# Patient Record
Sex: FEMALE | Race: BLACK OR AFRICAN AMERICAN | NOT HISPANIC OR LATINO | Employment: OTHER | URBAN - METROPOLITAN AREA
[De-identification: names, ages, dates, MRNs, and addresses within clinical notes are randomized per-mention and may not be internally consistent; named-entity substitution may affect disease eponyms.]

---

## 2017-09-26 ENCOUNTER — HOSPITAL ENCOUNTER (OUTPATIENT)
Dept: RADIOLOGY | Facility: HOSPITAL | Age: 57
Discharge: HOME | End: 2017-09-26
Attending: SURGERY
Payer: MEDICARE

## 2017-09-26 DIAGNOSIS — M54.30 SCIATICA: ICD-10-CM

## 2017-09-26 PROCEDURE — 72148 MRI LUMBAR SPINE W/O DYE: CPT | Mod: 26,,, | Performed by: RADIOLOGY

## 2017-09-26 PROCEDURE — 72148 MRI LUMBAR SPINE W/O DYE: CPT | Mod: TC

## 2018-05-16 ENCOUNTER — HOSPITAL ENCOUNTER (OUTPATIENT)
Dept: RADIOLOGY | Facility: HOSPITAL | Age: 58
Discharge: HOME | End: 2018-05-16
Attending: SURGERY
Payer: MEDICARE

## 2018-05-16 DIAGNOSIS — R13.10 DYSPHAGIA: ICD-10-CM

## 2018-05-16 PROCEDURE — 74220 X-RAY XM ESOPHAGUS 1CNTRST: CPT | Mod: TC

## 2018-05-16 PROCEDURE — 74220 X-RAY XM ESOPHAGUS 1CNTRST: CPT | Mod: 26,,, | Performed by: RADIOLOGY

## 2018-05-25 ENCOUNTER — HOSPITAL ENCOUNTER (EMERGENCY)
Facility: HOSPITAL | Age: 58
Discharge: HOME | End: 2018-05-25
Attending: EMERGENCY MEDICINE
Payer: MEDICARE

## 2018-05-25 VITALS
DIASTOLIC BLOOD PRESSURE: 82 MMHG | HEART RATE: 62 BPM | BODY MASS INDEX: 45.49 KG/M2 | TEMPERATURE: 98 F | SYSTOLIC BLOOD PRESSURE: 136 MMHG | WEIGHT: 265 LBS | RESPIRATION RATE: 18 BRPM

## 2018-05-25 DIAGNOSIS — G43.909 MIGRAINE WITHOUT STATUS MIGRAINOSUS, NOT INTRACTABLE, UNSPECIFIED MIGRAINE TYPE: Primary | ICD-10-CM

## 2018-05-25 PROCEDURE — 63600175 PHARM REV CODE 636 W HCPCS: Performed by: EMERGENCY MEDICINE

## 2018-05-25 PROCEDURE — 25000003 PHARM REV CODE 250: Performed by: EMERGENCY MEDICINE

## 2018-05-25 RX ORDER — KETOROLAC TROMETHAMINE 30 MG/ML
30 INJECTION, SOLUTION INTRAMUSCULAR; INTRAVENOUS
Status: COMPLETED | OUTPATIENT
Start: 2018-05-25 | End: 2018-05-25

## 2018-05-25 RX ORDER — BUTALBITAL, ACETAMINOPHEN AND CAFFEINE 50; 325; 40 MG/1; MG/1; MG/1
2 TABLET ORAL
Status: COMPLETED | OUTPATIENT
Start: 2018-05-25 | End: 2018-05-25

## 2018-05-25 RX ORDER — METOCLOPRAMIDE HYDROCHLORIDE 5 MG/ML
10 INJECTION INTRAMUSCULAR; INTRAVENOUS
Status: COMPLETED | OUTPATIENT
Start: 2018-05-25 | End: 2018-05-25

## 2018-05-25 RX ORDER — SUMATRIPTAN 6 MG/.5ML
6 INJECTION, SOLUTION SUBCUTANEOUS
Status: COMPLETED | OUTPATIENT
Start: 2018-05-25 | End: 2018-05-25

## 2018-05-25 RX ADMIN — SODIUM CHLORIDE 1000 ML: 0.9 INJECTION, SOLUTION INTRAVENOUS at 03:05

## 2018-05-25 RX ADMIN — METOCLOPRAMIDE 10 MG: 5 INJECTION, SOLUTION INTRAMUSCULAR; INTRAVENOUS at 03:05

## 2018-05-25 RX ADMIN — SUMATRIPTAN 6 MG: 6 INJECTION, SOLUTION SUBCUTANEOUS at 03:05

## 2018-05-25 RX ADMIN — BUTALBITAL, ACETAMINOPHEN, AND CAFFEINE 2 TABLET: 50; 325; 40 TABLET ORAL at 03:05

## 2018-05-25 RX ADMIN — KETOROLAC TROMETHAMINE 30 MG: 30 INJECTION, SOLUTION INTRAMUSCULAR at 03:05

## 2018-05-25 NOTE — ED TRIAGE NOTES
57 y.o. female presents to ER ED 02/ED 02A   Chief Complaint   Patient presents with    Migraine   pt reports migraine, pain is described as sharp pain to right eye down to neck. ibuprofen taken PTA. No acute distress noted.

## 2018-05-25 NOTE — ED PROVIDER NOTES
[facility]                                                  Chief Complaint  57 y.o. female with Migraine    History of Present Illness  Christina Townsend presents to the emergency room with complaints of acute migraine.  Patient has a history of migraines and reports that this one feels exactly the same as her previous migraines.  She gets approximately 2-3 migraines per year.  She has not had any migraine medicine at home.  She reports photophobia and nausea.  She denies neurologic symptoms other than headache.  She appears very comfortable.      Past Medical History:   Diagnosis Date    Morbid obesity     RA (rheumatoid arthritis)      Past Surgical History:   Procedure Laterality Date    CARPAL TUNNEL RELEASE  2009    left    HYSTERECTOMY      JOINT REPLACEMENT      partial hysterctomy  2010    TOTAL KNEE ARTHROPLASTY  4/4/11    left    TUBAL LIGATION        Review of patient's allergies indicates:   Allergen Reactions    Percocet [oxycodone-acetaminophen] Itching        Review of Systems and Physical Exam     Review of Systems  -- Constitution - no fever, denies fatigue, no weakness, no chills  -- Eyes - no tearing or redness, no visual disturbance  -- Ear, Nose - no tinnitus or earache, no nasal congestion or discharge  -- Mouth,Throat - no sore throat, no toothache, normal voice, normal swallowing  -- Respiratory - denies cough and congestion, no shortness of breath, no wheezing  -- Cardiovascular - denies chest pain, no palpitations, denies claudication  -- Gastrointestinal - denies abdominal pain, nausea, vomiting, or diarrhea  -- Genitourinary - no dysuria, no denies flank pain, no hematuria or frequency   -- Musculoskeletal - denies back pain, negative for myalgias and arthralgias   -- Neurological -reports headache, denies weakness or seizure; no LOC  -- Skin - denies pallor, rash, or changes in skin. no hives or welts noted    Vital Signs   weight is 120.2 kg (265 lb). Her  oral temperature is 98.1 °F (36.7 °C). Her blood pressure is 150/69 (abnormal) and her pulse is 76. Her respiration is 20.      Physical Exam  -- Nursing note and vitals reviewed  -- Constitutional: Appears well-developed and well-nourished, vitals within normal limits  -- Head: Atraumatic. Normocephalic. No obvious abnormality  -- Eyes: Pupils are equal and reactive to light. Normal conjunctiva and lids  -- Nose: Nose normal in appearance, nares grossly normal. No discharge  -- Throat: Mucous membranes moist, pharynx normal, normal tonsils. No lesions   -- Ears: External ears and TM normal bilaterally. Normal hearing and no drainage  -- Neck: Normal range of motion. Neck supple. No masses, trachea midline  -- Cardiac: Normal rate, regular rhythm and normal heart sounds  -- Pulmonary: Normal respiratory effort, breath sounds clear to auscultation  -- Abdominal: Soft, no tenderness. Normal bowel sounds. Normal liver edge  -- Musculoskeletal: Normal range of motion, no effusions. Joints stable   -- Neurological: Cranial nerves II through XII grossly intact No focal deficits. Showed good interaction with staff  -- Vascular: Posterior tibial, dorsalis pedis and radial pulses 2+ bilaterally    -- Lymphatics: No cervical or peripheral lymphadenopathy. No edema noted  -- Skin: Warm and dry. No evidence of rash or cellulitis  -- Psychiatric: Normal mood and affect. Bedside behavior is appropriate    Emergency Room Course     Treatment and Evaluation  1.  Physical exam consistent with migraine  2.  No saline 1 L with 10 mg of Reglan  3.  Imitrex 6 mg subcutaneous  4.  Toradol 30 IV  5.  Fioricet 2 tablets by mouth  6.  Patient feels complete relief of symptoms at 1610, DC home follow-up PCP    Abnormal lab values  Labs Reviewed - No data to display    Medications Given  Medications   sodium chloride 0.9% bolus 1,000 mL (1,000 mLs Intravenous New Bag 5/25/18 1521)   butalbital-acetaminophen-caffeine -40 mg per tablet 2  tablet (2 tablets Oral Given 5/25/18 1514)   ketorolac injection 30 mg (30 mg Intravenous Given 5/25/18 1521)   metoclopramide HCl injection 10 mg (10 mg Intravenous Given 5/25/18 1521)   SUMAtriptan succinate injection 6 mg (6 mg Subcutaneous Given 5/25/18 1515)         Diagnosis  -- Migraine    Disposition and Plan  -- Disposition: home  -- Condition: stable  -- Follow-up: Patient to follow up with Dino Rosado MD in 1-2 days.  -- I advised the patient that we have found no life threatening condition today  -- At this time, I believe the patient is clinically stable for discharge.   -- The patient acknowledges that close follow up with a MD is required   -- Patient agrees to comply with all instruction and direction given in the ER  -- Patient counseled on strict return precautions as discussed       Chyna Delgado MD  05/25/18 5359

## 2019-03-08 ENCOUNTER — HOSPITAL ENCOUNTER (EMERGENCY)
Facility: HOSPITAL | Age: 59
Discharge: HOME | End: 2019-03-08
Attending: SURGERY
Payer: MEDICARE

## 2019-03-08 VITALS
BODY MASS INDEX: 43.1 KG/M2 | SYSTOLIC BLOOD PRESSURE: 121 MMHG | HEART RATE: 81 BPM | TEMPERATURE: 100 F | DIASTOLIC BLOOD PRESSURE: 67 MMHG | RESPIRATION RATE: 19 BRPM | OXYGEN SATURATION: 97 % | WEIGHT: 251.13 LBS

## 2019-03-08 DIAGNOSIS — R52 GENERALIZED BODY ACHES: ICD-10-CM

## 2019-03-08 DIAGNOSIS — J40 BRONCHITIS: Primary | ICD-10-CM

## 2019-03-08 LAB
ALBUMIN SERPL BCP-MCNC: 3.3 G/DL
ALP SERPL-CCNC: 60 U/L
ALT SERPL W/O P-5'-P-CCNC: 9 U/L
ANION GAP SERPL CALC-SCNC: 9 MMOL/L
AST SERPL-CCNC: 15 U/L
BASOPHILS # BLD AUTO: 0.02 K/UL
BASOPHILS NFR BLD: 0.2 %
BILIRUB SERPL-MCNC: 0.7 MG/DL
BILIRUB UR QL STRIP: NEGATIVE
BUN SERPL-MCNC: 11 MG/DL
CALCIUM SERPL-MCNC: 9.3 MG/DL
CHLORIDE SERPL-SCNC: 101 MMOL/L
CK MB SERPL-MCNC: 0.2 NG/ML
CK MB SERPL-RTO: 0.1 %
CK SERPL-CCNC: 142 U/L
CK SERPL-CCNC: 142 U/L
CLARITY UR: CLEAR
CO2 SERPL-SCNC: 27 MMOL/L
COLOR UR: YELLOW
CREAT SERPL-MCNC: 1 MG/DL
DEPRECATED S PYO AG THROAT QL EIA: NEGATIVE
DIFFERENTIAL METHOD: ABNORMAL
EOSINOPHIL # BLD AUTO: 0 K/UL
EOSINOPHIL NFR BLD: 0.1 %
ERYTHROCYTE [DISTWIDTH] IN BLOOD BY AUTOMATED COUNT: 16.1 %
EST. GFR  (AFRICAN AMERICAN): >60 ML/MIN/1.73 M^2
EST. GFR  (NON AFRICAN AMERICAN): >60 ML/MIN/1.73 M^2
GLUCOSE SERPL-MCNC: 104 MG/DL
GLUCOSE UR QL STRIP: NEGATIVE
HCT VFR BLD AUTO: 39.6 %
HETEROPH AB SERPL QL IA: NEGATIVE
HGB BLD-MCNC: 13 G/DL
HGB UR QL STRIP: ABNORMAL
INFLUENZA A, MOLECULAR: NEGATIVE
INFLUENZA B, MOLECULAR: NEGATIVE
KETONES UR QL STRIP: NEGATIVE
LACTATE SERPL-SCNC: 0.7 MMOL/L
LEUKOCYTE ESTERASE UR QL STRIP: NEGATIVE
LYMPHOCYTES # BLD AUTO: 1.4 K/UL
LYMPHOCYTES NFR BLD: 15.9 %
MCH RBC QN AUTO: 26.3 PG
MCHC RBC AUTO-ENTMCNC: 32.8 G/DL
MCV RBC AUTO: 80 FL
MONOCYTES # BLD AUTO: 0.7 K/UL
MONOCYTES NFR BLD: 7.7 %
NEUTROPHILS # BLD AUTO: 6.8 K/UL
NEUTROPHILS NFR BLD: 76.1 %
NITRITE UR QL STRIP: NEGATIVE
PH UR STRIP: 8 [PH] (ref 5–8)
PLATELET # BLD AUTO: 201 K/UL
PMV BLD AUTO: 11.4 FL
POTASSIUM SERPL-SCNC: 3.6 MMOL/L
PROT SERPL-MCNC: 6.5 G/DL
PROT UR QL STRIP: NEGATIVE
RBC # BLD AUTO: 4.95 M/UL
SODIUM SERPL-SCNC: 137 MMOL/L
SP GR UR STRIP: 1.01 (ref 1–1.03)
SPECIMEN SOURCE: NORMAL
TROPONIN I SERPL DL<=0.01 NG/ML-MCNC: <0.006 NG/ML
URN SPEC COLLECT METH UR: ABNORMAL
UROBILINOGEN UR STRIP-ACNC: NEGATIVE EU/DL
WBC # BLD AUTO: 8.93 K/UL

## 2019-03-08 PROCEDURE — 83605 ASSAY OF LACTIC ACID: CPT

## 2019-03-08 PROCEDURE — 82553 CREATINE MB FRACTION: CPT

## 2019-03-08 PROCEDURE — 84484 ASSAY OF TROPONIN QUANT: CPT

## 2019-03-08 PROCEDURE — 85025 COMPLETE CBC W/AUTO DIFF WBC: CPT

## 2019-03-08 PROCEDURE — 87040 BLOOD CULTURE FOR BACTERIA: CPT | Mod: 59

## 2019-03-08 PROCEDURE — 99285 EMERGENCY DEPT VISIT HI MDM: CPT | Mod: 25

## 2019-03-08 PROCEDURE — 87502 INFLUENZA DNA AMP PROBE: CPT

## 2019-03-08 PROCEDURE — 63600175 PHARM REV CODE 636 W HCPCS: Performed by: SURGERY

## 2019-03-08 PROCEDURE — 81003 URINALYSIS AUTO W/O SCOPE: CPT

## 2019-03-08 PROCEDURE — 87081 CULTURE SCREEN ONLY: CPT

## 2019-03-08 PROCEDURE — 25000003 PHARM REV CODE 250: Performed by: NURSE PRACTITIONER

## 2019-03-08 PROCEDURE — 87880 STREP A ASSAY W/OPTIC: CPT

## 2019-03-08 PROCEDURE — 80053 COMPREHEN METABOLIC PANEL: CPT

## 2019-03-08 PROCEDURE — 36415 COLL VENOUS BLD VENIPUNCTURE: CPT

## 2019-03-08 PROCEDURE — 93010 EKG 12-LEAD: ICD-10-PCS | Mod: ,,, | Performed by: INTERNAL MEDICINE

## 2019-03-08 PROCEDURE — 86308 HETEROPHILE ANTIBODY SCREEN: CPT

## 2019-03-08 PROCEDURE — 93010 ELECTROCARDIOGRAM REPORT: CPT | Mod: ,,, | Performed by: INTERNAL MEDICINE

## 2019-03-08 PROCEDURE — 96372 THER/PROPH/DIAG INJ SC/IM: CPT | Mod: 59

## 2019-03-08 PROCEDURE — 93005 ELECTROCARDIOGRAM TRACING: CPT

## 2019-03-08 PROCEDURE — 82550 ASSAY OF CK (CPK): CPT

## 2019-03-08 RX ORDER — CEFTRIAXONE 1 G/1
1 INJECTION, POWDER, FOR SOLUTION INTRAMUSCULAR; INTRAVENOUS
Status: COMPLETED | OUTPATIENT
Start: 2019-03-08 | End: 2019-03-08

## 2019-03-08 RX ORDER — LEVOFLOXACIN 500 MG/1
500 TABLET, FILM COATED ORAL DAILY
Qty: 7 TABLET | Refills: 0 | Status: SHIPPED | OUTPATIENT
Start: 2019-03-08 | End: 2019-03-15

## 2019-03-08 RX ORDER — PROMETHAZINE HYDROCHLORIDE AND DEXTROMETHORPHAN HYDROBROMIDE 6.25; 15 MG/5ML; MG/5ML
5 SYRUP ORAL EVERY 6 HOURS PRN
Qty: 118 ML | Refills: 0 | Status: SHIPPED | OUTPATIENT
Start: 2019-03-08 | End: 2019-03-18

## 2019-03-08 RX ORDER — IBUPROFEN 800 MG/1
800 TABLET ORAL
Status: COMPLETED | OUTPATIENT
Start: 2019-03-08 | End: 2019-03-08

## 2019-03-08 RX ORDER — ACETAMINOPHEN 500 MG
1000 TABLET ORAL
Status: COMPLETED | OUTPATIENT
Start: 2019-03-08 | End: 2019-03-08

## 2019-03-08 RX ADMIN — CEFTRIAXONE SODIUM 1 G: 1 INJECTION, POWDER, FOR SOLUTION INTRAMUSCULAR; INTRAVENOUS at 10:03

## 2019-03-08 RX ADMIN — ACETAMINOPHEN 1000 MG: 500 TABLET, FILM COATED ORAL at 09:03

## 2019-03-08 RX ADMIN — IBUPROFEN 800 MG: 800 TABLET ORAL at 09:03

## 2019-03-09 NOTE — ED NOTES
Discharge instructions and rx x2 given, patient voiced understanding. Discharged to home in stable condition, ambulatory out of ER w steady gait, respirations even and unlabored, NAD.

## 2019-03-09 NOTE — ED PROVIDER NOTES
Encounter Date: 3/8/2019       History     Chief Complaint   Patient presents with    Fever     The history is provided by the patient.   Fever   Primary symptoms of the febrile illness include fever, fatigue and myalgias. Primary symptoms do not include headaches, cough, wheezing, shortness of breath, abdominal pain, nausea, vomiting, diarrhea, dysuria, arthralgias or rash. The current episode started today. This is a new problem. The problem has not changed since onset.  The maximum temperature recorded prior to her arrival was unknown.   The fatigue began today.   Myalgias began today. The myalgias are generalized. The myalgias are not associated with weakness, tenderness or swelling.     Review of patient's allergies indicates:   Allergen Reactions    Percocet [oxycodone-acetaminophen] Itching     Past Medical History:   Diagnosis Date    Morbid obesity     RA (rheumatoid arthritis)      Past Surgical History:   Procedure Laterality Date    ARTHROPLASTY, KNEE, TOTAL Right 11/5/2012    [facility]    CARPAL TUNNEL RELEASE  2009    left    HYSTERECTOMY      JOINT REPLACEMENT      partial hysterctomy  2010    TOTAL KNEE ARTHROPLASTY  4/4/11    left    TUBAL LIGATION       Family History   Problem Relation Age of Onset    Cancer Mother         Breast (?cured), then liver and bone    Hypertension Mother     Hypertension Father     Stroke Father     Hypertension Sister     Hypertension Brother      Social History     Tobacco Use    Smoking status: Current Every Day Smoker     Packs/day: 1.00     Years: 30.00     Pack years: 30.00     Types: Cigarettes    Smokeless tobacco: Never Used   Substance Use Topics    Alcohol use: Yes     Comment: occasional light drinker    Drug use: No     Review of Systems   Constitutional: Positive for fatigue and fever.   HENT: Positive for ear pain. Negative for congestion, rhinorrhea, sore throat and trouble swallowing.     Eyes: Negative for pain, discharge, redness and visual disturbance.   Respiratory: Negative for cough, shortness of breath and wheezing.    Cardiovascular: Negative for chest pain and leg swelling.   Gastrointestinal: Negative for abdominal pain, constipation, diarrhea, nausea and vomiting.   Genitourinary: Positive for frequency. Negative for difficulty urinating, dysuria, flank pain and urgency.   Musculoskeletal: Positive for myalgias. Negative for arthralgias, back pain and neck pain.   Skin: Negative for rash and wound.   Neurological: Negative for seizures, weakness and headaches.   Psychiatric/Behavioral: Negative.        Physical Exam     Initial Vitals [03/08/19 2054]   BP Pulse Resp Temp SpO2   (!) 146/79 99 18 (!) 103.7 °F (39.8 °C) 100 %      MAP       --         Physical Exam    Nursing note and vitals reviewed.  Constitutional: She is Obese . No distress.   HENT:   Head: Normocephalic and atraumatic.   Right Ear: Tympanic membrane, external ear and ear canal normal.   Left Ear: Tympanic membrane, external ear and ear canal normal.   Nose: Nose normal.   Mouth/Throat: Oropharynx is clear and moist.   Eyes: Conjunctivae, EOM and lids are normal. Pupils are equal, round, and reactive to light.   Neck: Neck supple.   Cardiovascular: Normal rate, regular rhythm, S1 normal, S2 normal, normal heart sounds and intact distal pulses.   Pulmonary/Chest: Effort normal and breath sounds normal. No respiratory distress.   Abdominal: Soft. Bowel sounds are normal. There is no tenderness.   Musculoskeletal: Normal range of motion.   Neurological: She is alert and oriented to person, place, and time. She has normal strength. GCS eye subscore is 4. GCS verbal subscore is 5. GCS motor subscore is 6.   Skin: Skin is warm and dry. Capillary refill takes less than 2 seconds. No rash noted.   Psychiatric: She has a normal mood and affect. Her speech is normal and behavior is normal.         ED Course   Procedures  Labs  Reviewed   URINALYSIS, REFLEX TO URINE CULTURE - Abnormal; Notable for the following components:       Result Value    Occult Blood UA Trace (*)     All other components within normal limits    Narrative:     Preferred Collection Type->Urine, Clean Catch   CBC W/ AUTO DIFFERENTIAL - Abnormal; Notable for the following components:    MCV 80 (*)     MCH 26.3 (*)     RDW 16.1 (*)     Gran% 76.1 (*)     Lymph% 15.9 (*)     All other components within normal limits   COMPREHENSIVE METABOLIC PANEL - Abnormal; Notable for the following components:    Albumin 3.3 (*)     ALT 9 (*)     All other components within normal limits   INFLUENZA A & B BY MOLECULAR   THROAT SCREEN, RAPID   CULTURE, BLOOD   CULTURE, BLOOD   CULTURE, STREP A,  THROAT   LACTIC ACID, PLASMA   CK-MB   CK   TROPONIN I   HETEROPHILE AB SCREEN          Imaging Results          X-Ray Chest AP Portable (Final result)  Result time 03/08/19 22:43:13    Final result by Pasquale Sanchez Jr., MD (03/08/19 22:43:13)                 Impression:      No acute findings.      Electronically signed by: Pasquale Sanchez MD  Date:    03/08/2019  Time:    22:43             Narrative:    EXAMINATION:  XR CHEST AP PORTABLE    CLINICAL HISTORY:  Sepsis;    COMPARISON:  No comparison studies are available.    FINDINGS:  Heart size is normal.  Lungs appear clear of active disease.  No infiltrates or effusions.  No suspicious mass.                                        Medications   acetaminophen tablet 1,000 mg (1,000 mg Oral Given 3/8/19 2105)   ibuprofen tablet 800 mg (800 mg Oral Given 3/8/19 2105)   cefTRIAXone injection 1 g (1 g Intramuscular Given 3/8/19 6059)                      Clinical Impression:       ICD-10-CM ICD-9-CM   1. Bronchitis J40 490   2. Generalized body aches R52 780.96            I took over this patient from the nurse practitioner at the 10:00 p.m. shift change  This patient had a low-grade temperature with cough and cold symptoms this week  Patient  is a smoker with no active wheezing on ER presentation today  Patient is concerned that she may have the flu, influenza swab the ER was negative  The patient is a clear chest x-ray with a dry hacking cough on presentation today  Negative workup, low-grade temperature, will start antibiotics with close follow-up advised                   Phi Covington MD  03/09/19 5105

## 2019-03-11 ENCOUNTER — HOSPITAL ENCOUNTER (EMERGENCY)
Facility: HOSPITAL | Age: 59
Discharge: HOME | End: 2019-03-11
Attending: SURGERY
Payer: MEDICARE

## 2019-03-11 VITALS
WEIGHT: 249 LBS | SYSTOLIC BLOOD PRESSURE: 131 MMHG | RESPIRATION RATE: 20 BRPM | OXYGEN SATURATION: 98 % | DIASTOLIC BLOOD PRESSURE: 86 MMHG | BODY MASS INDEX: 42.74 KG/M2 | TEMPERATURE: 99 F | HEART RATE: 72 BPM

## 2019-03-11 DIAGNOSIS — M79.605 LEFT LEG PAIN: Primary | ICD-10-CM

## 2019-03-11 DIAGNOSIS — M79.606 LEG PAIN: ICD-10-CM

## 2019-03-11 LAB
ALBUMIN SERPL BCP-MCNC: 3.4 G/DL
ALP SERPL-CCNC: 68 U/L
ALT SERPL W/O P-5'-P-CCNC: 20 U/L
ANION GAP SERPL CALC-SCNC: 7 MMOL/L
AST SERPL-CCNC: 21 U/L
BACTERIA THROAT CULT: NORMAL
BASOPHILS # BLD AUTO: 0.02 K/UL
BASOPHILS NFR BLD: 0.3 %
BILIRUB SERPL-MCNC: 0.3 MG/DL
BUN SERPL-MCNC: 11 MG/DL
CALCIUM SERPL-MCNC: 9.8 MG/DL
CHLORIDE SERPL-SCNC: 106 MMOL/L
CO2 SERPL-SCNC: 30 MMOL/L
CREAT SERPL-MCNC: 1 MG/DL
DIFFERENTIAL METHOD: ABNORMAL
EOSINOPHIL # BLD AUTO: 0.2 K/UL
EOSINOPHIL NFR BLD: 2.8 %
ERYTHROCYTE [DISTWIDTH] IN BLOOD BY AUTOMATED COUNT: 16.2 %
EST. GFR  (AFRICAN AMERICAN): >60 ML/MIN/1.73 M^2
EST. GFR  (NON AFRICAN AMERICAN): >60 ML/MIN/1.73 M^2
GLUCOSE SERPL-MCNC: 87 MG/DL
HCT VFR BLD AUTO: 42.4 %
HGB BLD-MCNC: 13.6 G/DL
LYMPHOCYTES # BLD AUTO: 2.2 K/UL
LYMPHOCYTES NFR BLD: 36.8 %
MCH RBC QN AUTO: 26.2 PG
MCHC RBC AUTO-ENTMCNC: 32.1 G/DL
MCV RBC AUTO: 82 FL
MONOCYTES # BLD AUTO: 0.8 K/UL
MONOCYTES NFR BLD: 12.7 %
NEUTROPHILS # BLD AUTO: 2.9 K/UL
NEUTROPHILS NFR BLD: 47.4 %
PLATELET # BLD AUTO: 237 K/UL
PMV BLD AUTO: 10.8 FL
POTASSIUM SERPL-SCNC: 3.7 MMOL/L
PROT SERPL-MCNC: 7.2 G/DL
RBC # BLD AUTO: 5.2 M/UL
SODIUM SERPL-SCNC: 143 MMOL/L
URATE SERPL-MCNC: 4.8 MG/DL
WBC # BLD AUTO: 6.08 K/UL

## 2019-03-11 PROCEDURE — 99285 EMERGENCY DEPT VISIT HI MDM: CPT

## 2019-03-11 PROCEDURE — 80053 COMPREHEN METABOLIC PANEL: CPT

## 2019-03-11 PROCEDURE — 84550 ASSAY OF BLOOD/URIC ACID: CPT

## 2019-03-11 PROCEDURE — 36415 COLL VENOUS BLD VENIPUNCTURE: CPT

## 2019-03-11 PROCEDURE — 25000003 PHARM REV CODE 250: Performed by: NURSE PRACTITIONER

## 2019-03-11 PROCEDURE — 85025 COMPLETE CBC W/AUTO DIFF WBC: CPT

## 2019-03-11 RX ORDER — IBUPROFEN 800 MG/1
800 TABLET ORAL
Status: COMPLETED | OUTPATIENT
Start: 2019-03-11 | End: 2019-03-11

## 2019-03-11 RX ADMIN — IBUPROFEN 800 MG: 800 TABLET ORAL at 04:03

## 2019-03-11 NOTE — ED TRIAGE NOTES
58 y.o. female presents to ER ED 02/ED 02A   Chief Complaint   Patient presents with    Leg Pain     left   pt c/o throbbing/tingling atraumatic left leg pain with warmth & swelling. No acute distress noted.

## 2019-03-11 NOTE — ED NOTES
The patient is awake, alert and cooperative with a calm affect, patient is aware of environment, family member at bedside. Airway is open and patent, respirations are spontaneous, normal respiratory effort and rate noted, skin warm and dry, full ROM in all extremities, appearance: no apparent distress noted, resting comfortably.  VSS, no change from previous assessment.  Bed in low, locked position, HOB 30 degrees.  Pt able to change position independently.  Call bell within reach of pt.  Pt verbalizes understanding of use.  Will continue to monitor.

## 2019-03-11 NOTE — ED PROVIDER NOTES
"Encounter Date: 3/11/2019       History     Chief Complaint   Patient presents with    Leg Pain     left     Christina Townsend is a 58 y.o. Female with PMH of morbid obesity, RA, and elephantitis who presents to the ED with reports of atruamatic pain to left lower extremity with associated redness, increased swelling, and warmth. Reports symptoms began X 2 days ago. Reports chronic hx of lower leg swelling, but noticed increasing pain to left lower ext. Redness to anterior leg "shin" with warmth to touch; denies lesions or skin excoriation. Denies fever, but reports hx of fever since 03/08- seen in the ED and dx with bronchitis; discharged with po Levaquin and ultram-patient reports did not fill prescription.       The history is provided by the patient.     Review of patient's allergies indicates:   Allergen Reactions    Percocet [oxycodone-acetaminophen] Itching     Past Medical History:   Diagnosis Date    Morbid obesity     RA (rheumatoid arthritis)      Past Surgical History:   Procedure Laterality Date    ARTHROPLASTY, KNEE, TOTAL Right 11/5/2012    [facility]    CARPAL TUNNEL RELEASE  2009    left    HYSTERECTOMY      JOINT REPLACEMENT      partial hysterctomy  2010    TOTAL KNEE ARTHROPLASTY  4/4/11    left    TUBAL LIGATION       Family History   Problem Relation Age of Onset    Cancer Mother         Breast (?cured), then liver and bone    Hypertension Mother     Hypertension Father     Stroke Father     Hypertension Sister     Hypertension Brother      Social History     Tobacco Use    Smoking status: Current Every Day Smoker     Packs/day: 1.00     Years: 30.00     Pack years: 30.00     Types: Cigarettes    Smokeless tobacco: Never Used   Substance Use Topics    Alcohol use: Yes     Comment: occasional light drinker    Drug use: No     Review of Systems   Constitutional: Negative.  Negative for activity change, chills and fever.   HENT: " Negative.  Negative for congestion, ear discharge, ear pain, postnasal drip, sinus pressure, sinus pain and sore throat.    Eyes: Negative.    Respiratory: Negative.  Negative for cough, chest tightness and shortness of breath.    Cardiovascular: Positive for leg swelling. Negative for chest pain.        Left lower leg increases swelling with anterior and posterior tenderness/pain. Denies trauma; reports hx of redness with warmth to touch.    Gastrointestinal: Negative.  Negative for abdominal distention, abdominal pain and nausea.   Endocrine: Negative.    Genitourinary: Negative.  Negative for dysuria, frequency and urgency.   Musculoskeletal: Negative.  Negative for back pain.   Skin: Negative.  Negative for rash.   Allergic/Immunologic: Negative.    Neurological: Negative.  Negative for dizziness, weakness, light-headedness and numbness.   Hematological: Negative.  Does not bruise/bleed easily.   Psychiatric/Behavioral: Negative.        Physical Exam     Initial Vitals [03/11/19 1536]   BP Pulse Resp Temp SpO2   139/65 76 18 99.1 °F (37.3 °C) 98 %      MAP       --         Physical Exam    Nursing note and vitals reviewed.  Constitutional: Vital signs are normal. She appears well-developed. She is Obese .   HENT:   Head: Normocephalic and atraumatic.   Right Ear: Tympanic membrane, external ear and ear canal normal.   Left Ear: Tympanic membrane, external ear and ear canal normal.   Nose: Nose normal.   Mouth/Throat: Uvula is midline, oropharynx is clear and moist and mucous membranes are normal.   Eyes: Conjunctivae and EOM are normal. Pupils are equal, round, and reactive to light.   Neck: Normal range of motion. Neck supple.   Cardiovascular: Normal rate, regular rhythm, normal heart sounds and intact distal pulses.   Pulses:       Dorsalis pedis pulses are 2+ on the right side, and 2+ on the left side.        Posterior tibial pulses are 2+ on the right side, and 2+ on the left side.   Pulmonary/Chest: Effort  normal and breath sounds normal. She has no decreased breath sounds. She has no wheezes. She has no rhonchi. She has no rales.   Abdominal: Soft. Bowel sounds are normal. There is no tenderness.   Musculoskeletal: Normal range of motion.   Left lower extremity swelling with pain; Neg. Homans. Mild erythema with warmth to tough. +2 left pedal and posterior tibial pulse. Mild varicosities noted to left lower extremity just above the ankle.    Neurological: She is alert and oriented to person, place, and time. She has normal strength. She displays normal reflexes. No cranial nerve deficit or sensory deficit.   Skin: Skin is warm and dry. Capillary refill takes less than 2 seconds. No rash noted.   Psychiatric: She has a normal mood and affect. Her behavior is normal. Judgment and thought content normal.         ED Course   Procedures  Labs Reviewed   COMPREHENSIVE METABOLIC PANEL - Abnormal; Notable for the following components:       Result Value    CO2 30 (*)     Albumin 3.4 (*)     Anion Gap 7 (*)     All other components within normal limits   CBC W/ AUTO DIFFERENTIAL - Abnormal; Notable for the following components:    MCH 26.2 (*)     RDW 16.2 (*)     All other components within normal limits   URIC ACID          Imaging Results          X-Ray Tibia Fibula 2 View Left (Final result)  Result time 03/11/19 16:17:10    Final result by PALMIRA Walters Sr., MD (03/11/19 16:17:10)                 Impression:      1. There is total prosthetic hardware in the left knee.  2. There is a joint effusion in the left knee.      Electronically signed by: Alex Walters MD  Date:    03/11/2019  Time:    16:17             Narrative:    EXAMINATION:  XR TIBIA FIBULA 2 VIEW LEFT    CLINICAL HISTORY:  Pain in leg, unspecified    COMPARISON:  None    FINDINGS:  There is no fracture. There is no dislocation.  There is total prosthetic hardware in the left knee.  There is a joint effusion in the left knee.                                US Lower Extremity Veins Left (Final result)  Result time 03/11/19 16:15:57    Final result by PALMIRA Walters Sr., MD (03/11/19 16:15:57)                 Impression:      Normal study.      Electronically signed by: Alex Walters MD  Date:    03/11/2019  Time:    16:15             Narrative:    EXAMINATION:  US LOWER EXTREMITY VEINS LEFT    CLINICAL HISTORY:  Pain in left leg    TECHNIQUE:  Multiple static images are submitted for interpretation with color flow and spectral doppler imaging.    COMPARISON:  None    FINDINGS:  The veins are normal in appearance and have normal compressibility. There are normal venous waveforms seen with augmentation during the compression of the veins. The left common femoral vein has a velocity of 25 cm/sec.                                  Medications   ibuprofen tablet 800 mg (not administered)                          Clinical Impression:       ICD-10-CM ICD-9-CM   1. Left leg pain M79.605 729.5   2. Leg pain M79.606 729.5         Disposition:   Disposition: Discharged  Condition: Stable    Discharged home.  Patient will continue Levaquin and Ultram at home for pain as previously prescribed. The patient acknowledges that close follow up with medical provider is required. Instructed to follow up with PCP within 2 days. Patient was given specific return precautions. The patient agrees to comply with all instruction and directions given in the ER.                     Isabel Sauer NP  03/11/19 5485

## 2019-03-11 NOTE — DISCHARGE INSTRUCTIONS
**Follow up with PCP in 24-48 hours. Return to ER with worsening of symptoms.     **Over the counter tylenol or motrin as needed for pain and/or fever as directed on package insert. Drink plenty fluids. Get plenty rest. Wash hands frequently.     **Our goal in the emergency department is to always give you outstanding care and exceptional service. You may receive a survey by mail or e-mail in the next week regarding your experience in our ED. We would greatly appreciate your completing and returning the survey. Your feedback provides us with a way to recognize our staff who give very good care and it helps us learn how to improve when your experience was below our aspiration of excellence.

## 2019-03-14 LAB
BACTERIA BLD CULT: NORMAL
BACTERIA BLD CULT: NORMAL

## 2019-03-18 ENCOUNTER — OFFICE VISIT (OUTPATIENT)
Dept: WOUND CARE | Facility: HOSPITAL | Age: 59
End: 2019-03-18
Attending: SURGERY
Payer: MEDICARE

## 2019-03-18 VITALS — HEART RATE: 69 BPM | SYSTOLIC BLOOD PRESSURE: 128 MMHG | DIASTOLIC BLOOD PRESSURE: 85 MMHG | RESPIRATION RATE: 20 BRPM

## 2019-03-18 DIAGNOSIS — I89.0 LYMPHEDEMA OF BOTH LOWER EXTREMITIES: ICD-10-CM

## 2019-03-18 PROCEDURE — 99213 OFFICE O/P EST LOW 20 MIN: CPT

## 2019-03-18 PROCEDURE — 99499 UNLISTED E&M SERVICE: CPT | Mod: ,,, | Performed by: SURGERY

## 2019-03-18 PROCEDURE — 99499 NO LOS: ICD-10-PCS | Mod: ,,, | Performed by: SURGERY

## 2019-03-18 RX ORDER — FUROSEMIDE 20 MG/1
20 TABLET ORAL DAILY PRN
Status: ON HOLD | COMMUNITY
End: 2020-11-20 | Stop reason: HOSPADM

## 2019-03-18 NOTE — PROGRESS NOTES
[facility]  Wound Care  History and Physical    Problem List Items Addressed This Visit     None            History:  50-year-old obese female who was referred to the wound Care Center due to lower extremity edema.  Patient has suffered from bilateral lower extremity edema for decades.  Patient denies any acute changes.  Patient denies any previous history of wounds.  Patient has had no specific treatment.  She states that she was recently in the emergency department for respiratory problems and did have a lower extremity ultrasound which was unremarkable.  There was no evidence of DVT.  Again patient's main complaint is chronic lower extremity edema.  She has no significant pain in the legs.  She has no history of recent infection.  She has no history of wounds in the past.  She has never had therapy or use compression.  She was seen by primary care physician and started on Lasix.  Patient denies any other medical problems.  She denies any history of a DVT.  She denies any renal problems.  She denies any heart failure.  Patient has had previous bilateral knee surgery.  Past Medical History:   Diagnosis Date    Morbid obesity     RA (rheumatoid arthritis)        Past Surgical History:   Procedure Laterality Date    ARTHROPLASTY, KNEE, TOTAL Right 11/5/2012    [facility]    CARPAL TUNNEL RELEASE  2009    left    HYSTERECTOMY      JOINT REPLACEMENT      partial hysterctomy  2010    TOTAL KNEE ARTHROPLASTY  4/4/11    left    TUBAL LIGATION         Family History   Problem Relation Age of Onset    Cancer Mother         Breast (?cured), then liver and bone    Hypertension Mother     Hypertension Father     Stroke Father     Hypertension Sister     Hypertension Brother         reports that she has been smoking cigarettes.  She has a 30.00 pack-year smoking history. she has never used smokeless tobacco. She reports that she drinks alcohol. She  reports that she does not use drugs.    has a current medication list which includes the following prescription(s): furosemide, promethazine-dextromethorphan, and tramadol.    Allergies:  Percocet [oxycodone-acetaminophen]    Review of Systems:  HIEU  Denies fever chills.  Denies chest pain or shortness of breath.  Denies headache seizures loss of conscious.  Denies dysuria hematuria.    There were no vitals filed for this visit.      BMI:  There is no height or weight on file to calculate BMI.    Physical Exam:  Physical Exam  Morbidly obese.  No acute distress.  Bilateral knee replacements.  Lymphedema bilateral lower extremity below the knee.  There is no erythema.  There is no open wound. She has palpable pedal pulses.  A1C:  No results for input(s): HGBA1C in the last 2160 hours.  BMP:  Recent Labs   Lab Result Units 03/11/19  1555   Glucose mg/dL 87   Sodium mmol/L 143   Potassium mmol/L 3.7   Chloride mmol/L 106   CO2 mmol/L 30*   BUN, Bld mg/dL 11   Creatinine mg/dL 1.0   Calcium mg/dL 9.8      CBC:  Recent Labs   Lab Result Units 03/08/19  2212 03/11/19  1555   WBC K/uL 8.93 6.08   RBC M/uL 4.95 5.20   Hemoglobin g/dL 13.0 13.6   Hematocrit % 39.6 42.4   Platelets K/uL 201 237   MCV fL 80* 82   MCH pg 26.3* 26.2*   MCHC g/dL 32.8 32.1     CMP:  Recent Labs   Lab Result Units 03/08/19 2212 03/11/19  1555   Glucose mg/dL 104 87   Calcium mg/dL 9.3 9.8   Albumin g/dL 3.3* 3.4*   Total Protein g/dL 6.5 7.2   Sodium mmol/L 137 143   Potassium mmol/L 3.6 3.7   CO2 mmol/L 27 30*   Chloride mmol/L 101 106   BUN, Bld mg/dL 11 11   Alkaline Phosphatase U/L 60 68   ALT U/L 9* 20   AST U/L 15 21   Total Bilirubin mg/dL 0.7 0.3     PREALBUMIN:  No results for input(s): PREALBUMIN in the last 2160 hours.  WOUND CULTURES:  No results for input(s): LABAERO in the last 2160 hours.        Plan:  See Wound Docs note for plan and follow up.  Patient will be referred to lymphedema nurse.  Patient instructed to lose weight.   Patient will need chronic compression.  Patient will be discharged from wound center.  No indication for admission to the Wound Center.      Zeke HANNAH Marino [facility]

## 2019-07-21 ENCOUNTER — HOSPITAL ENCOUNTER (EMERGENCY)
Facility: HOSPITAL | Age: 59
Discharge: HOME | End: 2019-07-21
Attending: SURGERY
Payer: MEDICARE

## 2019-07-21 VITALS
OXYGEN SATURATION: 97 % | SYSTOLIC BLOOD PRESSURE: 125 MMHG | HEART RATE: 59 BPM | RESPIRATION RATE: 18 BRPM | WEIGHT: 251 LBS | BODY MASS INDEX: 43.08 KG/M2 | TEMPERATURE: 99 F | DIASTOLIC BLOOD PRESSURE: 74 MMHG

## 2019-07-21 DIAGNOSIS — R09.1 PLEURISY: ICD-10-CM

## 2019-07-21 LAB
ALBUMIN SERPL BCP-MCNC: 3.6 G/DL (ref 3.5–5.2)
ALP SERPL-CCNC: 81 U/L (ref 55–135)
ALT SERPL W/O P-5'-P-CCNC: 15 U/L (ref 10–44)
AMPHET+METHAMPHET UR QL: NEGATIVE
ANION GAP SERPL CALC-SCNC: 11 MMOL/L (ref 8–16)
APTT BLDCRRT: 28 SEC (ref 21–32)
AST SERPL-CCNC: 15 U/L (ref 10–40)
BARBITURATES UR QL SCN>200 NG/ML: NEGATIVE
BASOPHILS # BLD AUTO: 0.03 K/UL (ref 0–0.2)
BASOPHILS NFR BLD: 0.4 % (ref 0–1.9)
BENZODIAZ UR QL SCN>200 NG/ML: NEGATIVE
BILIRUB SERPL-MCNC: 0.3 MG/DL (ref 0.1–1)
BILIRUB UR QL STRIP: NEGATIVE
BNP SERPL-MCNC: 21 PG/ML (ref 0–99)
BUN SERPL-MCNC: 15 MG/DL (ref 6–20)
BZE UR QL SCN: NEGATIVE
CALCIUM SERPL-MCNC: 9.8 MG/DL (ref 8.7–10.5)
CANNABINOIDS UR QL SCN: NEGATIVE
CHLORIDE SERPL-SCNC: 103 MMOL/L (ref 95–110)
CK MB SERPL-MCNC: 0.7 NG/ML (ref 0.1–6.5)
CK MB SERPL-RTO: 0.4 % (ref 0–5)
CK SERPL-CCNC: 175 U/L (ref 20–180)
CK SERPL-CCNC: 175 U/L (ref 20–180)
CLARITY UR: CLEAR
CO2 SERPL-SCNC: 28 MMOL/L (ref 23–29)
COLOR UR: YELLOW
CREAT SERPL-MCNC: 1.1 MG/DL (ref 0.5–1.4)
CREAT UR-MCNC: 32.5 MG/DL (ref 15–325)
D DIMER PPP IA.FEU-MCNC: 0.68 MG/L FEU
DIFFERENTIAL METHOD: ABNORMAL
EOSINOPHIL # BLD AUTO: 0.3 K/UL (ref 0–0.5)
EOSINOPHIL NFR BLD: 3.4 % (ref 0–8)
ERYTHROCYTE [DISTWIDTH] IN BLOOD BY AUTOMATED COUNT: 15.5 % (ref 11.5–14.5)
EST. GFR  (AFRICAN AMERICAN): >60 ML/MIN/1.73 M^2
EST. GFR  (NON AFRICAN AMERICAN): 55 ML/MIN/1.73 M^2
GLUCOSE SERPL-MCNC: 104 MG/DL (ref 70–110)
GLUCOSE UR QL STRIP: NEGATIVE
HCT VFR BLD AUTO: 42.6 % (ref 37–48.5)
HGB BLD-MCNC: 13.5 G/DL (ref 12–16)
HGB UR QL STRIP: NEGATIVE
IMM GRANULOCYTES # BLD AUTO: 0.02 K/UL (ref 0–0.04)
IMM GRANULOCYTES NFR BLD AUTO: 0.3 % (ref 0–0.5)
INR PPP: 0.9 (ref 0.8–1.2)
KETONES UR QL STRIP: NEGATIVE
LEUKOCYTE ESTERASE UR QL STRIP: NEGATIVE
LYMPHOCYTES # BLD AUTO: 2.6 K/UL (ref 1–4.8)
LYMPHOCYTES NFR BLD: 35 % (ref 18–48)
MCH RBC QN AUTO: 25.3 PG (ref 27–31)
MCHC RBC AUTO-ENTMCNC: 31.7 G/DL (ref 32–36)
MCV RBC AUTO: 80 FL (ref 82–98)
METHADONE UR QL SCN>300 NG/ML: NEGATIVE
MONOCYTES # BLD AUTO: 0.6 K/UL (ref 0.3–1)
MONOCYTES NFR BLD: 7.9 % (ref 4–15)
NEUTROPHILS # BLD AUTO: 3.9 K/UL (ref 1.8–7.7)
NEUTROPHILS NFR BLD: 53 % (ref 38–73)
NITRITE UR QL STRIP: NEGATIVE
NRBC BLD-RTO: 0 /100 WBC
OPIATES UR QL SCN: NEGATIVE
PCP UR QL SCN>25 NG/ML: NEGATIVE
PH UR STRIP: 6 [PH] (ref 5–8)
PLATELET # BLD AUTO: 246 K/UL (ref 150–350)
PMV BLD AUTO: 10.5 FL (ref 9.2–12.9)
POTASSIUM SERPL-SCNC: 3.8 MMOL/L (ref 3.5–5.1)
PROT SERPL-MCNC: 7.1 G/DL (ref 6–8.4)
PROT UR QL STRIP: NEGATIVE
PROTHROMBIN TIME: 9.9 SEC (ref 9–12.5)
RBC # BLD AUTO: 5.34 M/UL (ref 4–5.4)
SODIUM SERPL-SCNC: 142 MMOL/L (ref 136–145)
SP GR UR STRIP: 1.01 (ref 1–1.03)
TOXICOLOGY INFORMATION: NORMAL
TROPONIN I SERPL DL<=0.01 NG/ML-MCNC: <0.006 NG/ML (ref 0–0.03)
URN SPEC COLLECT METH UR: NORMAL
UROBILINOGEN UR STRIP-ACNC: NEGATIVE EU/DL
WBC # BLD AUTO: 7.37 K/UL (ref 3.9–12.7)

## 2019-07-21 PROCEDURE — 99285 EMERGENCY DEPT VISIT HI MDM: CPT | Mod: 25

## 2019-07-21 PROCEDURE — 81003 URINALYSIS AUTO W/O SCOPE: CPT | Mod: 59

## 2019-07-21 PROCEDURE — 96372 THER/PROPH/DIAG INJ SC/IM: CPT | Mod: 59

## 2019-07-21 PROCEDURE — 36415 COLL VENOUS BLD VENIPUNCTURE: CPT

## 2019-07-21 PROCEDURE — 93010 EKG 12-LEAD: ICD-10-PCS | Mod: ,,, | Performed by: INTERNAL MEDICINE

## 2019-07-21 PROCEDURE — 85730 THROMBOPLASTIN TIME PARTIAL: CPT

## 2019-07-21 PROCEDURE — 85610 PROTHROMBIN TIME: CPT

## 2019-07-21 PROCEDURE — 83880 ASSAY OF NATRIURETIC PEPTIDE: CPT

## 2019-07-21 PROCEDURE — 93005 ELECTROCARDIOGRAM TRACING: CPT

## 2019-07-21 PROCEDURE — 82550 ASSAY OF CK (CPK): CPT

## 2019-07-21 PROCEDURE — 63600175 PHARM REV CODE 636 W HCPCS: Performed by: SURGERY

## 2019-07-21 PROCEDURE — 82553 CREATINE MB FRACTION: CPT

## 2019-07-21 PROCEDURE — 85025 COMPLETE CBC W/AUTO DIFF WBC: CPT

## 2019-07-21 PROCEDURE — 85379 FIBRIN DEGRADATION QUANT: CPT

## 2019-07-21 PROCEDURE — 84484 ASSAY OF TROPONIN QUANT: CPT

## 2019-07-21 PROCEDURE — 80053 COMPREHEN METABOLIC PANEL: CPT

## 2019-07-21 PROCEDURE — 93010 ELECTROCARDIOGRAM REPORT: CPT | Mod: ,,, | Performed by: INTERNAL MEDICINE

## 2019-07-21 PROCEDURE — 25500020 PHARM REV CODE 255: Performed by: SURGERY

## 2019-07-21 PROCEDURE — 80307 DRUG TEST PRSMV CHEM ANLYZR: CPT

## 2019-07-21 RX ORDER — KETOROLAC TROMETHAMINE 10 MG/1
10 TABLET, FILM COATED ORAL EVERY 6 HOURS PRN
Qty: 15 TABLET | Refills: 0 | Status: SHIPPED | OUTPATIENT
Start: 2019-07-21 | End: 2020-10-09

## 2019-07-21 RX ORDER — METHYLPREDNISOLONE 4 MG/1
TABLET ORAL
Qty: 1 PACKAGE | Refills: 0 | Status: SHIPPED | OUTPATIENT
Start: 2019-07-21 | End: 2020-10-09

## 2019-07-21 RX ORDER — ONDANSETRON 2 MG/ML
4 INJECTION INTRAMUSCULAR; INTRAVENOUS
Status: COMPLETED | OUTPATIENT
Start: 2019-07-21 | End: 2019-07-21

## 2019-07-21 RX ORDER — CYCLOBENZAPRINE HCL 10 MG
10 TABLET ORAL 3 TIMES DAILY PRN
Qty: 10 TABLET | Refills: 0 | Status: SHIPPED | OUTPATIENT
Start: 2019-07-21 | End: 2019-07-26

## 2019-07-21 RX ORDER — MEPERIDINE HYDROCHLORIDE 25 MG/ML
25 INJECTION INTRAMUSCULAR; INTRAVENOUS; SUBCUTANEOUS
Status: COMPLETED | OUTPATIENT
Start: 2019-07-21 | End: 2019-07-21

## 2019-07-21 RX ADMIN — ONDANSETRON 4 MG: 2 INJECTION INTRAMUSCULAR; INTRAVENOUS at 07:07

## 2019-07-21 RX ADMIN — MEPERIDINE HYDROCHLORIDE 25 MG: 25 INJECTION INTRAMUSCULAR; INTRAVENOUS; SUBCUTANEOUS at 07:07

## 2019-07-21 RX ADMIN — IOHEXOL 100 ML: 350 INJECTION, SOLUTION INTRAVENOUS at 09:07

## 2019-07-22 NOTE — ED NOTES
The patient is awake, alert. Respirations are spontaneous, normal respiratory effort and rate noted, full ROM in all extremities, no distress noted, resting comfortably. No change from previous assessment. Bed in low, locked position. Pt able to change position independently. Will continue to monitor.

## 2019-07-22 NOTE — ED TRIAGE NOTES
58 y.o. female presents to ER   Chief Complaint   Patient presents with    Back Pain   pt c/o intermittent left upper back pain radiating to side into chest, left shoulder pain, & belching x's 4 days.  No acute distress noted.

## 2019-07-22 NOTE — ED NOTES
The patient is awake, alert, family member at bedside. Airway is open and patent, respirations are spontaneous, normal respiratory effort and rate noted, full ROM in all extremities, no distress noted, resting comfortably. No change from previous assessment. Bed in low, locked position. Pt able to change position independently. Will continue to monitor.

## 2019-07-22 NOTE — ED PROVIDER NOTES
[facility]                                                 Chief Complaint  58 y.o. female with Back Pain    History of Present Illness  Christina Townsend presents to the emergency room with left rib pain tonight  Patient states she has left-sided rib pain with any deep breath, no wheezing now  Patient is a heavy smoker but denies any chest pain, left posterior lower rib pain  Patient on exam has a normal cardiac evaluation, clear lung sounds noted now  Patient states any deep breath or movement of the torso elicits this pain tonight  She characterizes the pain as sharp left-sided 7/10, movement related on history    The history is provided by the patient   device was not used during this ER visit  Medical history: Bronchitis, morbid obesity, RA  Surgeries: Knee arthroplasty, carpal tunnel, hysterectomy, joint, BTL  Allergies: Percocet    I have reviewed all of this patient's past medical, surgical, family, and social   histories as well as active allergies and medications documented in the  electronic medical record    Review of Systems and Physical Exam      Review of Systems  -- Constitution - no fever, denies fatigue, no weakness, no chills  -- Eyes - no tearing or redness, no visual disturbance  -- Ear, Nose - no tinnitus or earache, no nasal congestion or discharge  -- Mouth,Throat - no sore throat, no toothache, normal voice, normal swallowing  -- Respiratory - denies cough and congestion, no shortness of breath, no TRUJILLO  -- Cardiovascular - denies chest pain, no palpitations, denies claudication  -- Gastrointestinal - denies abdominal pain, nausea, vomiting, or diarrhea  -- Genitourinary - no dysuria, denies flank pain, no hematuria, no STD risk  -- Musculoskeletal - left lower lobe rib pain with any deep breath  -- Neurological - no headache, denies weakness or seizure; no LOC  -- Skin - denies pallor, rash, or changes in skin. no hives or welts noted  --  Psychiatric - Denies SI or HI, no psychosis or fractured thought noted     Vital Signs  Her oral temperature is 98.5 °F (36.9 °C).   Her blood pressure is 144/67 and her pulse is 72.   Her respiration is 18 and oxygen saturation is 97%.     Physical Exam  -- Nursing note and vitals reviewed  -- Constitutional: Appears well-developed and well-nourished  -- Head: Atraumatic. Normocephalic. No obvious abnormality  -- Eyes: Pupils are equal and reactive to light. Normal conjunctiva and lids  -- Cardiac: Normal rate, regular rhythm and normal heart sounds  -- Pulmonary: Normal respiratory effort, breath sounds clear to auscultation  -- Abdominal: Soft, no tenderness. Normal bowel sounds. Normal liver edge  -- Musculoskeletal: Normal range of motion, no effusions. Joints stable   -- Neurological: No focal deficits. Showed good interaction with staff  -- Vascular: Posterior tibial, dorsalis pedis and radial pulses 2+ bilaterally      Emergency Room Course      Lab Results     K 3.8      CO2 28   BUN 15   CREATININE 1.1      ALKPHOS 81   AST 15   ALT 15   BILITOT 0.3   ALBUMIN 3.6   PROT 7.1   WBC 7.37   HGB 13.5   HCT 42.6            CPKMB 0.7   TROPONINI <0.006   INR 0.9   BNP 21   DDIMER 0.68 (H)     Urinalysis  -- Urinalysis performed during this ER visit showed no signs of infection      EKG   -- The EKG findings today were without concerning findings from baseline     Radiology  -- Chest x-ray showed no infiltrate and showed no acute pathology  -- The PE CT was negative for pulmonary embolism or aortic dissection     Medications Given  ondansetron injection 4 mg (4 mg Intramuscular Given 7/21/19 1922)   meperidine (PF) injection 25 mg (25 mg Intramuscular Given 7/21/19 1922)   iohexol (OMNIPAQUE 350) injection 100 mL (100 mLs Intravenous Given 7/21/19 2144)      ED Course  1918 Patient with left-sided rib pain, worse with coughing episode.  Smoker.  97% oxygen.  Denies chest  pain, only left lower rib pain, worse with movement.  Pleuritic like presentation.  Will commence with workup    2042 Patient with a mildly elevated D-dimer.  Left-sided rib pain, smoker, mild risk factors.  CT PE study performed.  Waiting on the results      2235 CT PE study is negative.  Patient is now asymptomatic.  Patient with pleurisy type picture.  Prescribed anti-inflammatories and muscle relaxers with steroid taper.  Counseled to stop smoking.  Patient follow up with PCP.  Return to ER if worsens      Diagnosis  -- The encounter diagnosis was Pleurisy.    Disposition and Plan  -- Disposition: home  -- Condition: stable  -- Follow-up: Patient to follow up with Dino Rosado MD in 1-2 days.  -- I advised the patient that we have found no life threatening condition today  -- At this time, I believe the patient is clinically stable for discharge.   -- The patient acknowledges that close follow up with a MD is required   -- Patient agrees to comply with all instruction and direction given in the ER    This note is dictated on M*Modal word recognition program.  There are word recognition mistakes that are occasionally missed on review.         Phi Covington MD  07/21/19 [id number]

## 2019-07-22 NOTE — ED NOTES
The patient is awake, alert, calm, family member at bedside. Normal respiratory effort and rate noted, full ROM in all extremities, resting comfortably. No change from previous assessment. Bed in low, locked position, Pt able to change position independently. Will continue to monitor.

## 2019-07-29 ENCOUNTER — HOSPITAL ENCOUNTER (EMERGENCY)
Facility: HOSPITAL | Age: 59
Discharge: HOME | End: 2019-07-29
Attending: SURGERY
Payer: MEDICARE

## 2019-07-29 VITALS
OXYGEN SATURATION: 99 % | WEIGHT: 251 LBS | DIASTOLIC BLOOD PRESSURE: 90 MMHG | SYSTOLIC BLOOD PRESSURE: 134 MMHG | RESPIRATION RATE: 20 BRPM | TEMPERATURE: 97 F | HEART RATE: 71 BPM | BODY MASS INDEX: 43.08 KG/M2

## 2019-07-29 DIAGNOSIS — B02.8 HERPES ZOSTER WITH COMPLICATION: Primary | ICD-10-CM

## 2019-07-29 PROCEDURE — 99284 EMERGENCY DEPT VISIT MOD MDM: CPT

## 2019-07-29 RX ORDER — VALACYCLOVIR HYDROCHLORIDE 1 G/1
1000 TABLET, FILM COATED ORAL 3 TIMES DAILY
Qty: 21 TABLET | Refills: 0 | Status: SHIPPED | OUTPATIENT
Start: 2019-07-29 | End: 2020-10-09

## 2019-07-29 RX ORDER — TRAMADOL HYDROCHLORIDE 50 MG/1
50 TABLET ORAL EVERY 6 HOURS PRN
Qty: 20 TABLET | Refills: 0 | Status: SHIPPED | OUTPATIENT
Start: 2019-07-29 | End: 2019-08-08

## 2019-07-29 NOTE — ED PROVIDER NOTES
[facility]                                                 Chief Complaint  58 y.o. female with Rash    History of Present Illness  Christina Townsend presents to the emergency room with chest wall rash  Patient has a left-sided chest wall rash for last week, shingles noted on exam  Patient has dried shingles rash on the left chest wall, following the dermatome  Patient has no cellulitis, no abscess, no signs of complication on ER evaluation    The history is provided by the patient   device was not used during this ER visit  Medical history: Bronchitis, morbid obesity, RA  Surgeries: Knee arthroplasty, carpal tunnel, hysterectomy, joint, BTL  Allergies: Percocet    I have reviewed all of this patient's past medical, surgical, family, and social   histories as well as active allergies and medications documented in the  electronic medical record    Review of Systems and Physical Exam      Review of Systems  -- Constitution - no fever, denies fatigue, no weakness, no chills  -- Eyes - no tearing or redness, no visual disturbance  -- Ear, Nose - no tinnitus or earache, no nasal congestion or discharge  -- Mouth,Throat - no sore throat, no toothache, normal voice, normal swallowing  -- Respiratory - denies cough and congestion, no shortness of breath, no TRUJILLO  -- Cardiovascular - denies chest pain, no palpitations, denies claudication  -- Gastrointestinal - denies abdominal pain, nausea, vomiting, or diarrhea  -- Genitourinary - no dysuria, no hematuria, no flank pain, no bladder pain  -- Musculoskeletal - denies back pain, negative for trauma or injury  -- Neurological - no headache, denies weakness or seizure; no LOC  -- Skin - left chest wall rash    Vital Signs  Her oral temperature is 97 °F (36.1 °C).   Her blood pressure is 134/90 and her pulse is 71.   Her respiration is 20 and oxygen saturation is 99%.     Physical Exam  -- Nursing note and vitals reviewed  --  Constitutional: Appears well-developed and well-nourished  -- Head: Atraumatic. Normocephalic. No obvious abnormality  -- Cardiac: Normal rate, regular rhythm and normal heart sounds  -- Pulmonary: Normal respiratory effort, breath sounds clear to auscultation  -- Abdominal: Soft, no tenderness. Normal bowel sounds. Normal liver edge  -- Musculoskeletal: Normal range of motion, no effusions. Joints stable   -- Neurological: No focal deficits. Showed good interaction with staff  -- Skin: Shingles rash on the left chest wall dermatome    Emergency Room Course      Diagnosis  -- The encounter diagnosis was Herpes zoster with complication.    Disposition and Plan  -- Disposition: home  -- Condition: stable  -- Follow-up: Patient to follow up with Dino Rosado MD in 1-2 days.  -- I advised the patient that we have found no life threatening condition today  -- At this time, I believe the patient is clinically stable for discharge.   -- The patient acknowledges that close follow up with a MD is required   -- Patient agrees to comply with all instruction and direction given in the ER    This note is dictated on M*Modal word recognition program.  There are word recognition mistakes that are occasionally missed on review.          Phi Covington MD  07/29/19 2898

## 2019-07-29 NOTE — ED TRIAGE NOTES
58 y.o. female presents to ER   Chief Complaint   Patient presents with    Rash   Pt has shingles rash to left side of back, reports pain. No acute distress noted.

## 2020-03-17 ENCOUNTER — HOSPITAL ENCOUNTER (EMERGENCY)
Facility: HOSPITAL | Age: 60
Discharge: HOME | End: 2020-03-18
Attending: SURGERY
Payer: MEDICARE

## 2020-03-17 VITALS
OXYGEN SATURATION: 97 % | DIASTOLIC BLOOD PRESSURE: 73 MMHG | HEART RATE: 100 BPM | SYSTOLIC BLOOD PRESSURE: 142 MMHG | TEMPERATURE: 101 F | RESPIRATION RATE: 20 BRPM

## 2020-03-17 DIAGNOSIS — J10.1 INFLUENZA A: Primary | ICD-10-CM

## 2020-03-17 DIAGNOSIS — R50.9 FEVER: ICD-10-CM

## 2020-03-17 LAB
BASOPHILS # BLD AUTO: 0.02 K/UL (ref 0–0.2)
BASOPHILS NFR BLD: 0.3 % (ref 0–1.9)
DIFFERENTIAL METHOD: ABNORMAL
EOSINOPHIL # BLD AUTO: 0 K/UL (ref 0–0.5)
EOSINOPHIL NFR BLD: 0.2 % (ref 0–8)
ERYTHROCYTE [DISTWIDTH] IN BLOOD BY AUTOMATED COUNT: 15.8 % (ref 11.5–14.5)
GROUP A STREP, MOLECULAR: NEGATIVE
HCT VFR BLD AUTO: 40.7 % (ref 37–48.5)
HGB BLD-MCNC: 12.9 G/DL (ref 12–16)
IMM GRANULOCYTES # BLD AUTO: 0.02 K/UL (ref 0–0.04)
IMM GRANULOCYTES NFR BLD AUTO: 0.3 % (ref 0–0.5)
LYMPHOCYTES # BLD AUTO: 0.9 K/UL (ref 1–4.8)
LYMPHOCYTES NFR BLD: 14.4 % (ref 18–48)
MCH RBC QN AUTO: 25.8 PG (ref 27–31)
MCHC RBC AUTO-ENTMCNC: 31.7 G/DL (ref 32–36)
MCV RBC AUTO: 81 FL (ref 82–98)
MONOCYTES # BLD AUTO: 0.6 K/UL (ref 0.3–1)
MONOCYTES NFR BLD: 9.8 % (ref 4–15)
NEUTROPHILS # BLD AUTO: 4.5 K/UL (ref 1.8–7.7)
NEUTROPHILS NFR BLD: 75 % (ref 38–73)
NRBC BLD-RTO: 0 /100 WBC
PLATELET # BLD AUTO: 229 K/UL (ref 150–350)
PMV BLD AUTO: 10.7 FL (ref 9.2–12.9)
RBC # BLD AUTO: 5 M/UL (ref 4–5.4)
WBC # BLD AUTO: 6.04 K/UL (ref 3.9–12.7)

## 2020-03-17 PROCEDURE — 93010 ELECTROCARDIOGRAM REPORT: CPT | Mod: ,,, | Performed by: INTERNAL MEDICINE

## 2020-03-17 PROCEDURE — 80053 COMPREHEN METABOLIC PANEL: CPT

## 2020-03-17 PROCEDURE — 83880 ASSAY OF NATRIURETIC PEPTIDE: CPT

## 2020-03-17 PROCEDURE — 93005 ELECTROCARDIOGRAM TRACING: CPT

## 2020-03-17 PROCEDURE — 87040 BLOOD CULTURE FOR BACTERIA: CPT

## 2020-03-17 PROCEDURE — 93010 EKG 12-LEAD: ICD-10-PCS | Mod: ,,, | Performed by: INTERNAL MEDICINE

## 2020-03-17 PROCEDURE — 87502 INFLUENZA DNA AMP PROBE: CPT

## 2020-03-17 PROCEDURE — 84484 ASSAY OF TROPONIN QUANT: CPT

## 2020-03-17 PROCEDURE — 25000003 PHARM REV CODE 250: Performed by: SURGERY

## 2020-03-17 PROCEDURE — 83605 ASSAY OF LACTIC ACID: CPT

## 2020-03-17 PROCEDURE — 99285 EMERGENCY DEPT VISIT HI MDM: CPT | Mod: 25

## 2020-03-17 PROCEDURE — 82553 CREATINE MB FRACTION: CPT

## 2020-03-17 PROCEDURE — 82550 ASSAY OF CK (CPK): CPT

## 2020-03-17 PROCEDURE — 87651 STREP A DNA AMP PROBE: CPT

## 2020-03-17 PROCEDURE — 85025 COMPLETE CBC W/AUTO DIFF WBC: CPT

## 2020-03-17 RX ORDER — ACETAMINOPHEN 500 MG
1000 TABLET ORAL
Status: COMPLETED | OUTPATIENT
Start: 2020-03-17 | End: 2020-03-17

## 2020-03-17 RX ORDER — IBUPROFEN 800 MG/1
800 TABLET ORAL
Status: COMPLETED | OUTPATIENT
Start: 2020-03-17 | End: 2020-03-17

## 2020-03-17 RX ADMIN — ACETAMINOPHEN 1000 MG: 500 TABLET, FILM COATED ORAL at 11:03

## 2020-03-17 RX ADMIN — IBUPROFEN 800 MG: 800 TABLET, FILM COATED ORAL at 11:03

## 2020-03-18 LAB
ALBUMIN SERPL BCP-MCNC: 3.5 G/DL (ref 3.5–5.2)
ALP SERPL-CCNC: 64 U/L (ref 55–135)
ALT SERPL W/O P-5'-P-CCNC: 20 U/L (ref 10–44)
ANION GAP SERPL CALC-SCNC: 10 MMOL/L (ref 8–16)
AST SERPL-CCNC: 21 U/L (ref 10–40)
BILIRUB SERPL-MCNC: 0.2 MG/DL (ref 0.1–1)
BNP SERPL-MCNC: <10 PG/ML (ref 0–99)
BUN SERPL-MCNC: 12 MG/DL (ref 6–20)
CALCIUM SERPL-MCNC: 9.3 MG/DL (ref 8.7–10.5)
CHLORIDE SERPL-SCNC: 104 MMOL/L (ref 95–110)
CK MB SERPL-MCNC: 0.4 NG/ML (ref 0.1–6.5)
CK MB SERPL-RTO: 0.2 % (ref 0–5)
CK SERPL-CCNC: 204 U/L (ref 20–180)
CK SERPL-CCNC: 204 U/L (ref 20–180)
CO2 SERPL-SCNC: 26 MMOL/L (ref 23–29)
CREAT SERPL-MCNC: 1 MG/DL (ref 0.5–1.4)
EST. GFR  (AFRICAN AMERICAN): >60 ML/MIN/1.73 M^2
EST. GFR  (NON AFRICAN AMERICAN): >60 ML/MIN/1.73 M^2
GLUCOSE SERPL-MCNC: 107 MG/DL (ref 70–110)
INFLUENZA A, MOLECULAR: POSITIVE
INFLUENZA B, MOLECULAR: NEGATIVE
LACTATE SERPL-SCNC: 0.7 MMOL/L (ref 0.5–2.2)
POTASSIUM SERPL-SCNC: 4 MMOL/L (ref 3.5–5.1)
PROT SERPL-MCNC: 7.1 G/DL (ref 6–8.4)
SODIUM SERPL-SCNC: 140 MMOL/L (ref 136–145)
SPECIMEN SOURCE: ABNORMAL
TROPONIN I SERPL DL<=0.01 NG/ML-MCNC: 0.01 NG/ML (ref 0–0.03)

## 2020-03-18 RX ORDER — PROMETHAZINE HYDROCHLORIDE AND DEXTROMETHORPHAN HYDROBROMIDE 6.25; 15 MG/5ML; MG/5ML
5 SYRUP ORAL EVERY 6 HOURS PRN
Qty: 118 ML | Refills: 0 | Status: SHIPPED | OUTPATIENT
Start: 2020-03-18 | End: 2020-03-23

## 2020-03-18 RX ORDER — OSELTAMIVIR PHOSPHATE 75 MG/1
75 CAPSULE ORAL 2 TIMES DAILY
Qty: 10 CAPSULE | Refills: 0 | Status: SHIPPED | OUTPATIENT
Start: 2020-03-18 | End: 2020-03-23

## 2020-03-18 NOTE — ED PROVIDER NOTES
[facility]                                                 Chief Complaint  59 y.o. female with Fever; Cough; and Generalized Body Aches    History of Present Illness  Christina Townsend presents to the emergency room with cough tonight  Patient with fever and generalized cough and body aches tonight at home  Patient on exam has clear nasal drainage with nasal mucosa erythema noted  Patient has clear lung sounds with no wheezing or sputum identified tonight  No hypoxia, no travel outside the country, no wheezing or distress noted here  ROS in the ER today shows no signs or symptoms suspicious for coronavirus     The history is provided by the patient   device was not used during this ER visit  Medical history: Bronchitis, morbid obesity, RA  Surgeries: Knee arthroplasty, carpal tunnel, hysterectomy, joint, BTL  Allergies: Percocet    I have reviewed all of this patient's past medical, surgical, family, and social   histories as well as active allergies and medications documented in the  electronic medical record    Review of Systems and Physical Exam      Review of Systems  -- Constitution - fever, denies fatigue, no weakness, no chills  -- Eyes - no tearing or redness, no visual disturbance  -- Ear, Nose - sneezing, nasal congestion and clear discharge   -- Mouth,Throat - sore throat, no toothache, normal voice, normal swallowing  -- Respiratory - cough and congestion, no shortness of breath, no TRUJILLO  -- Cardiovascular - denies chest pain, no palpitations, denies claudication  -- Gastrointestinal - denies abdominal pain, nausea, vomiting, or diarrhea  -- Genitourinary - no dysuria, no hematuria, no flank pain, no bladder pain  -- Musculoskeletal - denies back pain, negative for trauma or injury  -- Neurological - no headache, denies weakness or seizure; no LOC  -- Skin - denies pallor, rash, or changes in skin. no hives or welts noted     BP (!) 142/73  Pulse 100   Temp  (!) 100.8 °F (38.2 °C)   Resp 20   SpO2 97%      Physical Exam  -- Nursing note and vitals reviewed  -- Constitutional: Appears well-developed and well-nourished  -- Head: Atraumatic. Normocephalic. No obvious abnormality  -- Eyes: Pupils are equal and reactive to light. Normal conjunctiva and lids  -- Nose: nasal mucosa erythema and edema; clear nasal discharge noted   -- Throat: post-nasal drip with mild posterior oropharnyx erythema  -- Ears: External ears and TM normal bilaterally. Normal hearing and no drainage  -- Neck: Normal range of motion. Neck supple. No masses, trachea midline  -- Cardiac: Normal rate, regular rhythm and normal heart sounds  -- Pulmonary: Normal respiratory effort, breath sounds clear to auscultation  -- Abdominal: Soft, no tenderness. Normal bowel sounds. Normal liver edge  -- Musculoskeletal: Normal range of motion, no effusions. Joints stable   -- Neurological: No focal deficits. Showed good interaction with staff  -- Vascular: Posterior tibial, dorsalis pedis and radial pulses 2+ bilaterally       Emergency Room Course      Lab Results     K 4.0      CO2 26   BUN 12   CREATININE 1.0      ALKPHOS 64   AST 21   ALT 20   BILITOT 0.2   ALBUMIN 3.5   PROT 7.1   WBC 6.04   HGB 12.9   HCT 40.7       (H)    (H)   CPKMB 0.4   TROPONINI 0.015   BNP <10   LACTATE 0.7     Additional Workup   -- The EKG findings today were without concerning findings from baseline  -- Chest x-ray showed no infiltrate and showed no acute pathology  -- The strep screen was negative  -- Blood cultures have also been drawn, results are pending  -- the patient tested positive for influenza A  -- PO 1 g Tylenol given in today in the ER  --  mg Motrin given in today in the ER    Coronavirus Testing Criteria  -- Does the patient have symptoms and fever?Yes  -- Did the patient have a negative flu test: No  -- Healthcare worker in contact with COVID? No  -- Pregnant woman?  No  -- Immunocompromise patient? No  -- Lives in communal setting? No  -- Infant less than 10 weeks of age? No  -- Did not meet any testing criteria set forth by [facility] guidelines     Diagnosis  -- The primary encounter diagnosis was Influenza A.   -- A diagnosis of Fever was also pertinent to this visit.    Disposition and Plan  -- Disposition: home  -- Condition: stable  -- Follow-up: Patient to follow up with Dino Rosado MD in 1-2 days.  -- I advised the patient that we have found no life threatening condition today  -- At this time, I believe the patient is clinically stable for discharge.   -- The patient acknowledges that close follow up with a MD is required   -- Patient agrees to comply with all instruction and direction given in the ER    This note is dictated on M*Modal word recognition program.  There are word recognition mistakes that are occasionally missed on review.         Phi Covington MD  03/18/20 0020

## 2020-03-23 ENCOUNTER — HOSPITAL ENCOUNTER (EMERGENCY)
Facility: HOSPITAL | Age: 60
Discharge: HOME | End: 2020-03-23
Payer: MEDICARE

## 2020-03-23 VITALS
OXYGEN SATURATION: 100 % | BODY MASS INDEX: 43.36 KG/M2 | HEART RATE: 56 BPM | DIASTOLIC BLOOD PRESSURE: 80 MMHG | SYSTOLIC BLOOD PRESSURE: 176 MMHG | RESPIRATION RATE: 20 BRPM | WEIGHT: 254 LBS | HEIGHT: 64 IN | TEMPERATURE: 97 F

## 2020-03-23 DIAGNOSIS — J06.9 UPPER RESPIRATORY TRACT INFECTION, UNSPECIFIED TYPE: Primary | ICD-10-CM

## 2020-03-23 DIAGNOSIS — R05.9 COUGH: ICD-10-CM

## 2020-03-23 LAB
BACTERIA BLD CULT: NORMAL
BACTERIA BLD CULT: NORMAL
GROUP A STREP, MOLECULAR: NEGATIVE
INFLUENZA A, MOLECULAR: NEGATIVE
INFLUENZA B, MOLECULAR: NEGATIVE
SPECIMEN SOURCE: NORMAL

## 2020-03-23 PROCEDURE — U0002 COVID-19 LAB TEST NON-CDC: HCPCS

## 2020-03-23 PROCEDURE — 99284 EMERGENCY DEPT VISIT MOD MDM: CPT | Mod: 25

## 2020-03-23 PROCEDURE — 87502 INFLUENZA DNA AMP PROBE: CPT

## 2020-03-23 PROCEDURE — 87651 STREP A DNA AMP PROBE: CPT

## 2020-03-23 RX ORDER — PROMETHAZINE HYDROCHLORIDE AND DEXTROMETHORPHAN HYDROBROMIDE 6.25; 15 MG/5ML; MG/5ML
5 SYRUP ORAL EVERY 6 HOURS PRN
Qty: 120 ML | Refills: 0 | Status: SHIPPED | OUTPATIENT
Start: 2020-03-23 | End: 2020-04-02

## 2020-03-23 RX ORDER — ALBUTEROL SULFATE 90 UG/1
1-2 AEROSOL, METERED RESPIRATORY (INHALATION) EVERY 6 HOURS PRN
Qty: 6.7 G | Refills: 0 | Status: SHIPPED | OUTPATIENT
Start: 2020-03-23 | End: 2020-10-09

## 2020-03-23 RX ORDER — AZITHROMYCIN 250 MG/1
250 TABLET, FILM COATED ORAL DAILY
Qty: 6 TABLET | Refills: 0 | Status: SHIPPED | OUTPATIENT
Start: 2020-03-23 | End: 2020-10-09

## 2020-03-24 LAB — SARS-COV-2 RNA RESP QL NAA+PROBE: NOT DETECTED

## 2020-03-24 NOTE — ED PROVIDER NOTES
"Encounter Date: 3/23/2020       History     Chief Complaint   Patient presents with    Cough    Shortness of Breath     The history is provided by the patient.   URI   The primary symptoms include fever, fatigue, ear pain, sore throat, cough, wheezing and myalgias. Primary symptoms do not include headaches, swollen glands, abdominal pain, nausea, vomiting, arthralgias or rash. The current episode started several days ago. This is a new problem. The problem has been gradually worsening. The temperature was taken by no thermometer. The maximum temperature recorded prior to her arrival was unknown.   The right ear is affected.   The sore throat is not accompanied by trouble swallowing, drooling, hoarse voice or stridor.   The cough is productive. There is nondescript sputum produced.   Wheezing occurs intermittently.   The myalgias are generalized. The myalgias are not associated with weakness, tenderness or swelling.   The onset of the illness is associated with exposure to sick contacts (close contact/exposure to someone who tested positive for COVID-19). Symptoms associated with the illness include chills, congestion and rhinorrhea.     Review of patient's allergies indicates:   Allergen Reactions    Lortab [hydrocodone-acetaminophen] Itching     STATES "CAN'T TAKE LORTAB 5 MG,BUT CAN TAKE LORTAB 10 MG     Past Medical History:   Diagnosis Date    Bronchitis     Morbid obesity     RA (rheumatoid arthritis)      Past Surgical History:   Procedure Laterality Date    CARPAL TUNNEL RELEASE  2009    left    HYSTERECTOMY      JOINT REPLACEMENT      partial hysterctomy  2010    TOTAL KNEE ARTHROPLASTY  4/4/11    left    TUBAL LIGATION       Family History   Problem Relation Age of Onset    Cancer Mother         Breast (?cured), then liver and bone    Hypertension Mother     Hypertension Father     Stroke Father     Hypertension Sister     Hypertension Brother      Social History     Tobacco Use    " Smoking status: Current Every Day Smoker     Packs/day: 1.00     Years: 30.00     Pack years: 30.00     Types: Cigarettes    Smokeless tobacco: Never Used   Substance Use Topics    Alcohol use: Yes     Comment: occasional light drinker    Drug use: No     Review of Systems   Constitutional: Positive for chills, fatigue and fever.   HENT: Positive for congestion, ear pain, rhinorrhea and sore throat. Negative for drooling, hoarse voice and trouble swallowing.    Eyes: Negative for pain and redness.   Respiratory: Positive for cough and wheezing. Negative for shortness of breath and stridor.    Cardiovascular: Negative for chest pain.   Gastrointestinal: Negative for abdominal pain, nausea and vomiting.   Genitourinary: Negative for difficulty urinating and dysuria.   Musculoskeletal: Positive for myalgias. Negative for arthralgias, back pain and neck pain.   Skin: Negative for rash and wound.   Neurological: Negative for seizures, weakness and headaches.   Psychiatric/Behavioral: Negative.        Physical Exam     Initial Vitals [03/23/20 1914]   BP Pulse Resp Temp SpO2   (!) 178/76 64 20 99.3 °F (37.4 °C) 99 %      MAP       --         Physical Exam    Nursing note and vitals reviewed.  Constitutional: No distress.   HENT:   Head: Normocephalic and atraumatic.   Right Ear: Tympanic membrane, external ear and ear canal normal.   Left Ear: Tympanic membrane, external ear and ear canal normal.   Nose: Rhinorrhea present.   Mouth/Throat: Mucous membranes are normal. No oropharyngeal exudate.   Clear post nasal drip noted. Erythema bilateral nasal mucosa with clear nasal discharge noted.   Eyes: Conjunctivae, EOM and lids are normal. Pupils are equal, round, and reactive to light.   Neck: Neck supple.   Cardiovascular: Normal rate, regular rhythm, S1 normal, S2 normal, normal heart sounds and intact distal pulses.   Pulmonary/Chest: Effort normal and breath sounds normal. No respiratory distress. She has no wheezes.    Abdominal: Soft. Bowel sounds are normal. There is no tenderness.   Musculoskeletal: Normal range of motion.   Neurological: She is alert and oriented to person, place, and time. She has normal strength. GCS eye subscore is 4. GCS verbal subscore is 5. GCS motor subscore is 6.   Skin: Skin is warm and dry. Capillary refill takes less than 2 seconds. No rash noted.   Psychiatric: She has a normal mood and affect. Her speech is normal and behavior is normal.         ED Course   Procedures  Labs Reviewed   INFLUENZA A & B BY MOLECULAR   GROUP A STREP, MOLECULAR   SARS-COV-2 (COVID-19) QUALITATIVE PCR          Imaging Results          X-Ray Chest 1 View (Final result)  Result time 03/23/20 20:00:38    Final result by Ilia Ledesma MD (03/23/20 20:00:38)                 Impression:      No acute cardiopulmonary disease.      Electronically signed by: Ilia Ledesma  Date:    03/23/2020  Time:    20:00             Narrative:    EXAMINATION:  XR CHEST 1 VIEW    CLINICAL HISTORY:  Cough    COMPARISON:  03/17/2020    FINDINGS:  The heart is normal in size.  Lungs are clear.                                                                 Clinical Impression:       ICD-10-CM ICD-9-CM   1. Upper respiratory tract infection, unspecified type J06.9 465.9   2. Cough R05 786.2     The patient acknowledges that close follow up with medical provider is required. Instructed to follow up with PCP within 2 days. Patient was given specific return precautions. The patient agrees to comply with all instruction and directions given in the ER.     Disposition:   Disposition: Discharged  Condition: Stable     ED Disposition Condition    Discharge Stable        ED Prescriptions     Medication Sig Dispense Start Date End Date Auth. Provider    azithromycin (Z-ANGELES) 250 MG tablet Take 1 tablet (250 mg total) by mouth once daily. Take first 2 tablets together, then 1 every day until finished. 6 tablet 3/23/2020  Angela Schwartz NP    albuterol  (PROVENTIL/VENTOLIN HFA) 90 mcg/actuation inhaler Inhale 1-2 puffs into the lungs every 6 (six) hours as needed for Wheezing. Rescue 6.7 g 3/23/2020 3/23/2021 Angela Schwartz NP    promethazine-dextromethorphan (PROMETHAZINE-DM) 6.25-15 mg/5 mL Syrp Take 5 mLs by mouth every 6 (six) hours as needed. 120 mL 3/23/2020 4/2/2020 Angela Schwartz NP        Follow-up Information     Follow up With Specialties Details Why Contact Info    Dino Rosado MD General Surgery Schedule an appointment as soon as possible for a visit in 2 days  [address] [place] [id number]  [phone]                                       Angela Schwartz NP  03/23/20 4813

## 2020-03-24 NOTE — ED TRIAGE NOTES
"Patient reports "SOB, cough, and body aches since Tuesday. Patient was diagnosed with Influenza Tuesday, but is concerned because her son was diagnosed with COVID 19 today. Patient states "my son lives in [place] and I was taking care of his child."  Patient states "has had diarrhea, abdominal cramping, and nausea.  "

## 2020-03-24 NOTE — ED NOTES
Discharge instructions given to patient and family. Discussed medications prescribed and when next time due. Discussed follow up with family doctor. Instructed patient to follow up with her family doctor for blood pressure. Instructed patient to return to ER for worsening of symptoms.

## 2020-03-25 NOTE — PROVIDER PROGRESS NOTES - EMERGENCY DEPT.
ED Physician Note: Coronavirus Testing         This patient was tested for coronavirus and during recent ER visit  The patient's test was negative; call tonight to give these ER results  2 patient identifiers were checked, patient voiced understanding  Patient needs to follow up with PCP until resolution of symptoms        Phi Covington M.D. 10:32 PM 3/24/2020

## 2020-04-24 ENCOUNTER — HOSPITAL ENCOUNTER (EMERGENCY)
Facility: HOSPITAL | Age: 60
Discharge: HOME | End: 2020-04-24
Attending: SURGERY
Payer: MEDICARE

## 2020-04-24 VITALS
BODY MASS INDEX: 43.66 KG/M2 | DIASTOLIC BLOOD PRESSURE: 65 MMHG | WEIGHT: 255.75 LBS | HEART RATE: 81 BPM | OXYGEN SATURATION: 99 % | SYSTOLIC BLOOD PRESSURE: 126 MMHG | TEMPERATURE: 98 F | HEIGHT: 64 IN | RESPIRATION RATE: 18 BRPM

## 2020-04-24 DIAGNOSIS — M79.641 RIGHT HAND PAIN: ICD-10-CM

## 2020-04-24 PROCEDURE — 99283 EMERGENCY DEPT VISIT LOW MDM: CPT | Mod: 25

## 2020-04-24 PROCEDURE — 25000003 PHARM REV CODE 250: Performed by: SURGERY

## 2020-04-24 RX ORDER — TRAMADOL HYDROCHLORIDE 50 MG/1
50 TABLET ORAL EVERY 6 HOURS PRN
Qty: 7 TABLET | Refills: 0 | Status: SHIPPED | OUTPATIENT
Start: 2020-04-24 | End: 2020-04-26

## 2020-04-24 RX ORDER — TRAMADOL HYDROCHLORIDE 50 MG/1
50 TABLET ORAL
Status: COMPLETED | OUTPATIENT
Start: 2020-04-24 | End: 2020-04-24

## 2020-04-24 RX ADMIN — TRAMADOL HYDROCHLORIDE 50 MG: 50 TABLET, FILM COATED ORAL at 09:04

## 2020-04-24 NOTE — ED NOTES
states her right wrist has been hurting x 2 days and no relief with wrapping of extremity or OTC meds. Denies injury or trauma. Further states that last time she was here the nurse told her Lortab 5 and lortab 10 was the same medication, however she can only take 10mg not 5mg due to itching.

## 2020-04-24 NOTE — ED PROVIDER NOTES
[facility]                                                 Chief Complaint  59 y.o. female with Hand Pain   -- c/o severe right hand pain around wrist area x 2 days with no relief  -- ibuprofen and wrapping with no relief    History of Present Illness  Christina Townsend presents to the emergency room with right wrist pain  Patient has chronic arthritis and rheumatoid arthritis related joint pain daily  Patient states she had no trauma or fall, has no history of gout on ER interview  Patient on exam has arthritic changes without gross deformity or osteophyte  Good distal pulses and capillary refill, neurovascular intact on ER evaluation    The history is provided by the patient   device was not used during this ER visit  Medical history: Bronchitis, morbid obesity, RA  Surgeries: Knee arthroplasty, carpal tunnel, hysterectomy, joint, BTL  Allergies: Lortab    I have reviewed all of this patient's past medical, surgical, family, and social   histories as well as active allergies and medications documented in the  electronic medical record    Review of Systems and Physical Exam      Review of Systems  -- Constitution - no fever, denies fatigue, no weakness, no chills  -- Eyes - no tearing or redness, no visual disturbance  -- Ear, Nose - no tinnitus or earache, no nasal congestion or discharge  -- Mouth,Throat - no sore throat, no toothache, normal voice, normal swallowing  -- Respiratory - denies cough and congestion, no shortness of breath, no TRUJILLO  -- Cardiovascular - denies chest pain, no palpitations, denies claudication  -- Gastrointestinal - denies abdominal pain, nausea, vomiting, or diarrhea  -- Genitourinary - no dysuria, denies flank pain, no hematuria, no STD risk  -- Musculoskeletal - right wrist pain  -- Neurological - no headache, denies weakness or seizure; no LOC  -- Skin - denies pallor, rash, or changes in skin. no hives or welts noted    Vital Signs  Her  oral temperature is 98.2 °F (36.8 °C).   Her blood pressure is 172/78 and her pulse is 81.   Her respiration is 18 and oxygen saturation is 99%.     Physical Exam  -- Nursing note and vitals reviewed  -- Constitutional: Appears well-developed and well-nourished  -- Head: Atraumatic. Normocephalic. No obvious abnormality  -- Eyes: Pupils are equal and reactive to light. Normal conjunctiva and lids  -- Cardiac: Normal rate, regular rhythm and normal heart sounds  -- Pulmonary: Normal respiratory effort, breath sounds clear to auscultation  -- Abdominal: Soft, no tenderness. Normal bowel sounds. Normal liver edge  -- Musculoskeletal: Normal range of motion, no effusions. Joints stable   -- Neurological: No focal deficits. Showed good interaction with staff  -- Vascular: Posterior tibial, dorsalis pedis and radial pulses 2+ bilaterally      Emergency Room Course      Treatment and Evaluation  -- Preliminary ER x-ray readings showed no evidence of fracture or dislocation  -- All x-rays are reviewed with a final disposition given by the radiologist  -- PO 50 mg Ultram given in today in the ER    Diagnosis  -- The encounter diagnosis was Right hand pain.    Disposition and Plan  -- Disposition: home  -- Condition: stable  -- Follow-up: Patient to follow up with Dino Rosado MD in 1-2 days.  -- I advised the patient that we have found no life threatening condition today  -- At this time, I believe the patient is clinically stable for discharge.   -- The patient acknowledges that close follow up with a MD is required   -- Patient agrees to comply with all instruction and direction given in the ER    This note is dictated on M*Modal word recognition program.  There are word recognition mistakes that are occasionally missed on review.         Phi Covington MD  04/24/20 0921     No

## 2020-06-30 ENCOUNTER — HOSPITAL ENCOUNTER (EMERGENCY)
Facility: HOSPITAL | Age: 60
Discharge: HOME | End: 2020-06-30
Attending: SURGERY
Payer: MEDICARE

## 2020-06-30 VITALS
RESPIRATION RATE: 20 BRPM | SYSTOLIC BLOOD PRESSURE: 140 MMHG | DIASTOLIC BLOOD PRESSURE: 80 MMHG | WEIGHT: 260.06 LBS | TEMPERATURE: 98 F | OXYGEN SATURATION: 98 % | BODY MASS INDEX: 44.63 KG/M2 | HEART RATE: 78 BPM

## 2020-06-30 DIAGNOSIS — R35.0 URINARY FREQUENCY: Primary | ICD-10-CM

## 2020-06-30 LAB
ALBUMIN SERPL BCP-MCNC: 3.5 G/DL (ref 3.5–5.2)
ALP SERPL-CCNC: 79 U/L (ref 55–135)
ALT SERPL W/O P-5'-P-CCNC: 12 U/L (ref 10–44)
ANION GAP SERPL CALC-SCNC: 10 MMOL/L (ref 8–16)
AST SERPL-CCNC: 15 U/L (ref 10–40)
BASOPHILS # BLD AUTO: 0.03 K/UL (ref 0–0.2)
BASOPHILS NFR BLD: 0.5 % (ref 0–1.9)
BILIRUB SERPL-MCNC: 0.4 MG/DL (ref 0.1–1)
BILIRUB UR QL STRIP: NEGATIVE
BUN SERPL-MCNC: 9 MG/DL (ref 6–20)
CALCIUM SERPL-MCNC: 9.8 MG/DL (ref 8.7–10.5)
CHLORIDE SERPL-SCNC: 106 MMOL/L (ref 95–110)
CLARITY UR: CLEAR
CO2 SERPL-SCNC: 27 MMOL/L (ref 23–29)
COLOR UR: YELLOW
CREAT SERPL-MCNC: 0.9 MG/DL (ref 0.5–1.4)
DIFFERENTIAL METHOD: ABNORMAL
EOSINOPHIL # BLD AUTO: 0.1 K/UL (ref 0–0.5)
EOSINOPHIL NFR BLD: 2.3 % (ref 0–8)
ERYTHROCYTE [DISTWIDTH] IN BLOOD BY AUTOMATED COUNT: 15.7 % (ref 11.5–14.5)
EST. GFR  (AFRICAN AMERICAN): >60 ML/MIN/1.73 M^2
EST. GFR  (NON AFRICAN AMERICAN): >60 ML/MIN/1.73 M^2
GLUCOSE SERPL-MCNC: 108 MG/DL (ref 70–110)
GLUCOSE UR QL STRIP: NEGATIVE
HCT VFR BLD AUTO: 43.6 % (ref 37–48.5)
HGB BLD-MCNC: 13.5 G/DL (ref 12–16)
HGB UR QL STRIP: NEGATIVE
IMM GRANULOCYTES # BLD AUTO: 0.03 K/UL (ref 0–0.04)
IMM GRANULOCYTES NFR BLD AUTO: 0.5 % (ref 0–0.5)
INFLUENZA A, MOLECULAR: NEGATIVE
INFLUENZA B, MOLECULAR: NEGATIVE
KETONES UR QL STRIP: NEGATIVE
LEUKOCYTE ESTERASE UR QL STRIP: NEGATIVE
LIPASE SERPL-CCNC: 9 U/L (ref 4–60)
LYMPHOCYTES # BLD AUTO: 2.3 K/UL (ref 1–4.8)
LYMPHOCYTES NFR BLD: 36.3 % (ref 18–48)
MCH RBC QN AUTO: 25.9 PG (ref 27–31)
MCHC RBC AUTO-ENTMCNC: 31 G/DL (ref 32–36)
MCV RBC AUTO: 84 FL (ref 82–98)
MONOCYTES # BLD AUTO: 0.7 K/UL (ref 0.3–1)
MONOCYTES NFR BLD: 11.9 % (ref 4–15)
NEUTROPHILS # BLD AUTO: 3 K/UL (ref 1.8–7.7)
NEUTROPHILS NFR BLD: 48.5 % (ref 38–73)
NITRITE UR QL STRIP: NEGATIVE
NRBC BLD-RTO: 0 /100 WBC
PH UR STRIP: 7 [PH] (ref 5–8)
PLATELET # BLD AUTO: 253 K/UL (ref 150–350)
PMV BLD AUTO: 10.5 FL (ref 9.2–12.9)
POTASSIUM SERPL-SCNC: 4 MMOL/L (ref 3.5–5.1)
PROT SERPL-MCNC: 7.2 G/DL (ref 6–8.4)
PROT UR QL STRIP: NEGATIVE
RBC # BLD AUTO: 5.21 M/UL (ref 4–5.4)
SODIUM SERPL-SCNC: 143 MMOL/L (ref 136–145)
SP GR UR STRIP: <=1.005 (ref 1–1.03)
SPECIMEN SOURCE: NORMAL
URN SPEC COLLECT METH UR: ABNORMAL
UROBILINOGEN UR STRIP-ACNC: NEGATIVE EU/DL
WBC # BLD AUTO: 6.22 K/UL (ref 3.9–12.7)

## 2020-06-30 PROCEDURE — 87502 INFLUENZA DNA AMP PROBE: CPT

## 2020-06-30 PROCEDURE — 85025 COMPLETE CBC W/AUTO DIFF WBC: CPT

## 2020-06-30 PROCEDURE — 80053 COMPREHEN METABOLIC PANEL: CPT

## 2020-06-30 PROCEDURE — 83690 ASSAY OF LIPASE: CPT

## 2020-06-30 PROCEDURE — 99284 EMERGENCY DEPT VISIT MOD MDM: CPT

## 2020-06-30 PROCEDURE — 36415 COLL VENOUS BLD VENIPUNCTURE: CPT

## 2020-06-30 PROCEDURE — 81003 URINALYSIS AUTO W/O SCOPE: CPT

## 2020-06-30 RX ORDER — SULFAMETHOXAZOLE AND TRIMETHOPRIM 800; 160 MG/1; MG/1
1 TABLET ORAL 2 TIMES DAILY
Qty: 14 TABLET | Refills: 0 | Status: SHIPPED | OUTPATIENT
Start: 2020-06-30 | End: 2020-07-07

## 2020-06-30 RX ORDER — PHENAZOPYRIDINE HYDROCHLORIDE 200 MG/1
200 TABLET, FILM COATED ORAL 3 TIMES DAILY
Qty: 6 TABLET | Refills: 0 | Status: SHIPPED | OUTPATIENT
Start: 2020-06-30 | End: 2020-07-02

## 2020-06-30 NOTE — ED TRIAGE NOTES
59 y.o. female presents to ER ED 03 /ED 03A   Chief Complaint   Patient presents with    Abdominal Pain     Lower abdominal pressure X 3 days    Cough     productive cough X 3 days   . No acute distress noted.

## 2020-06-30 NOTE — ED PROVIDER NOTES
"Encounter Date: 6/30/2020       History     Chief Complaint   Patient presents with    Abdominal Pain     Lower abdominal pressure X 3 days    Cough     productive cough X 3 days     Patient is 59-year-old black female with suprapubic pressure and urgency for 3 days.  She is voiding small amounts, also complains of minimal dysuria.  She has had some nausea without vomiting.  She has had productive cough also for 3 days.        Review of patient's allergies indicates:   Allergen Reactions    Lortab [hydrocodone-acetaminophen] Itching     STATES "CAN'T TAKE LORTAB 5 MG,BUT CAN TAKE LORTAB 10 MG     Past Medical History:   Diagnosis Date    Bronchitis     Morbid obesity     RA (rheumatoid arthritis)      Past Surgical History:   Procedure Laterality Date    CARPAL TUNNEL RELEASE  2009    left    HYSTERECTOMY      JOINT REPLACEMENT      partial hysterctomy  2010    TOTAL KNEE ARTHROPLASTY  4/4/11    left    TUBAL LIGATION       Family History   Problem Relation Age of Onset    Cancer Mother         Breast (?cured), then liver and bone    Hypertension Mother     Hypertension Father     Stroke Father     Hypertension Sister     Hypertension Brother      Social History     Tobacco Use    Smoking status: Current Every Day Smoker     Packs/day: 1.00     Years: 30.00     Pack years: 30.00     Types: Cigarettes    Smokeless tobacco: Never Used   Substance Use Topics    Alcohol use: Yes     Comment: occasional light drinker    Drug use: No     Review of Systems   Constitutional: Negative for fever.   HENT: Negative for congestion, ear pain, rhinorrhea, sore throat and trouble swallowing.    Eyes: Negative for pain.   Respiratory: Positive for cough. Negative for shortness of breath and wheezing.    Cardiovascular: Negative for chest pain, palpitations and leg swelling.   Gastrointestinal: Negative for abdominal pain, constipation, diarrhea and nausea.   Genitourinary: Positive for dysuria, frequency and " urgency. Negative for difficulty urinating, flank pain and hematuria.   Musculoskeletal: Positive for myalgias. Negative for arthralgias, back pain and neck pain.   Skin: Negative for rash and wound.   Neurological: Negative for speech difficulty, weakness and headaches.   Hematological: Does not bruise/bleed easily.       Physical Exam     Initial Vitals [06/30/20 1258]   BP Pulse Resp Temp SpO2   (!) 140/80 78 20 98.4 °F (36.9 °C) 98 %      MAP       --         Physical Exam    Nursing note and vitals reviewed.  Constitutional: No distress.   HENT:   Head: Normocephalic and atraumatic.   Nose: Nose normal.   Mouth/Throat: Oropharynx is clear and moist.   Eyes: Conjunctivae and EOM are normal. Pupils are equal, round, and reactive to light.   Neck: Normal range of motion. Neck supple.   Cardiovascular: Normal rate, regular rhythm, normal heart sounds and intact distal pulses.   Pulmonary/Chest: Breath sounds normal. No respiratory distress. She has no wheezes.   Abdominal: Soft. Bowel sounds are normal. She exhibits no distension. There is no abdominal tenderness.   Musculoskeletal: Normal range of motion.   Neurological: She is alert and oriented to person, place, and time. She has normal strength.   Skin: Skin is warm and dry.   Psychiatric: She has a normal mood and affect. Thought content normal.         ED Course   Procedures  Labs Reviewed - No data to display       Imaging Results    None            LAB WORK UNREMARKABLE.  URINALYSIS UNREMARKABLE.                               Clinical Impression:       ICD-10-CM ICD-9-CM   1. Urinary frequency  R35.0 788.41         Disposition:   Disposition: Discharged  Condition: Stable                        Tesfaye Wyman Jr., MD  06/30/20 0395

## 2020-06-30 NOTE — ED NOTES
The patient is awake, alert, aware of environment. Airway is open and patent, respirations are spontaneous, normal respiratory effort and rate noted, full ROM in all extremities, no distress noted. Bed in low, locked position, pt able to change position independently. Will continue to monitor.

## 2020-10-06 ENCOUNTER — HOSPITAL ENCOUNTER (EMERGENCY)
Facility: HOSPITAL | Age: 60
Discharge: HOME | End: 2020-10-06
Attending: SURGERY
Payer: MEDICARE

## 2020-10-06 VITALS
SYSTOLIC BLOOD PRESSURE: 143 MMHG | BODY MASS INDEX: 44.94 KG/M2 | TEMPERATURE: 98 F | RESPIRATION RATE: 20 BRPM | DIASTOLIC BLOOD PRESSURE: 72 MMHG | WEIGHT: 261.81 LBS | HEART RATE: 77 BPM

## 2020-10-06 DIAGNOSIS — G43.909 MIGRAINE WITHOUT STATUS MIGRAINOSUS, NOT INTRACTABLE, UNSPECIFIED MIGRAINE TYPE: Primary | ICD-10-CM

## 2020-10-06 PROCEDURE — 96361 HYDRATE IV INFUSION ADD-ON: CPT

## 2020-10-06 PROCEDURE — 25000003 PHARM REV CODE 250: Performed by: SURGERY

## 2020-10-06 PROCEDURE — 99284 EMERGENCY DEPT VISIT MOD MDM: CPT | Mod: 25

## 2020-10-06 PROCEDURE — 63600175 PHARM REV CODE 636 W HCPCS: Performed by: SURGERY

## 2020-10-06 PROCEDURE — 96372 THER/PROPH/DIAG INJ SC/IM: CPT | Mod: 59

## 2020-10-06 PROCEDURE — 96374 THER/PROPH/DIAG INJ IV PUSH: CPT

## 2020-10-06 RX ORDER — SUMATRIPTAN 6 MG/.5ML
6 INJECTION, SOLUTION SUBCUTANEOUS
Status: COMPLETED | OUTPATIENT
Start: 2020-10-06 | End: 2020-10-06

## 2020-10-06 RX ORDER — SODIUM CHLORIDE 9 MG/ML
1000 INJECTION, SOLUTION INTRAVENOUS
Status: COMPLETED | OUTPATIENT
Start: 2020-10-06 | End: 2020-10-06

## 2020-10-06 RX ORDER — BUTALBITAL, ACETAMINOPHEN AND CAFFEINE 50; 325; 40 MG/1; MG/1; MG/1
2 TABLET ORAL
Status: COMPLETED | OUTPATIENT
Start: 2020-10-06 | End: 2020-10-06

## 2020-10-06 RX ORDER — KETOROLAC TROMETHAMINE 30 MG/ML
30 INJECTION, SOLUTION INTRAMUSCULAR; INTRAVENOUS
Status: COMPLETED | OUTPATIENT
Start: 2020-10-06 | End: 2020-10-06

## 2020-10-06 RX ADMIN — SUMATRIPTAN 6 MG: 6 INJECTION, SOLUTION SUBCUTANEOUS at 08:10

## 2020-10-06 RX ADMIN — BUTALBITAL, ACETAMINOPHEN, AND CAFFEINE 2 TABLET: 50; 325; 40 TABLET ORAL at 08:10

## 2020-10-06 RX ADMIN — KETOROLAC TROMETHAMINE 30 MG: 30 INJECTION, SOLUTION INTRAMUSCULAR at 08:10

## 2020-10-06 RX ADMIN — SODIUM CHLORIDE 1000 ML: 0.9 INJECTION, SOLUTION INTRAVENOUS at 08:10

## 2020-10-06 NOTE — ED PROVIDER NOTES
"Encounter Date: 10/6/2020       History     Chief Complaint   Patient presents with    Migraine     Patient is old black female with history of migraine headache.  She had onset of atypical migraine headache 3 days ago, conservative measures have not helped in alleviating the pain.  Denies tunnel vision, nausea or vomiting.  Some photophobia is reported.        Review of patient's allergies indicates:   Allergen Reactions    Lortab [hydrocodone-acetaminophen] Itching     STATES "CAN'T TAKE LORTAB 5 MG,BUT CAN TAKE LORTAB 10 MG     Past Medical History:   Diagnosis Date    Bronchitis     Morbid obesity     RA (rheumatoid arthritis)      Past Surgical History:   Procedure Laterality Date    CARPAL TUNNEL RELEASE  2009    left    HYSTERECTOMY      JOINT REPLACEMENT      partial hysterctomy  2010    TOTAL KNEE ARTHROPLASTY  4/4/11    left    TUBAL LIGATION       Family History   Problem Relation Age of Onset    Cancer Mother         Breast (?cured), then liver and bone    Hypertension Mother     Hypertension Father     Stroke Father     Hypertension Sister     Hypertension Brother      Social History     Tobacco Use    Smoking status: Current Every Day Smoker     Packs/day: 1.00     Years: 30.00     Pack years: 30.00     Types: Cigarettes    Smokeless tobacco: Never Used   Substance Use Topics    Alcohol use: Yes     Comment: occasional light drinker    Drug use: No     Review of Systems   Constitutional: Negative for fever.   HENT: Negative for congestion, ear pain, rhinorrhea, sore throat and trouble swallowing.    Eyes: Positive for photophobia. Negative for pain.   Respiratory: Negative for cough, shortness of breath and wheezing.    Cardiovascular: Negative for chest pain, palpitations and leg swelling.   Gastrointestinal: Negative for abdominal pain, constipation, diarrhea and nausea.   Genitourinary: Negative for difficulty urinating, dysuria, flank pain, frequency, hematuria and urgency. "   Musculoskeletal: Negative for arthralgias, back pain, myalgias and neck pain.   Skin: Negative for rash and wound.   Neurological: Positive for headaches. Negative for speech difficulty and weakness.   Hematological: Does not bruise/bleed easily.       Physical Exam     Initial Vitals [10/06/20 0725]   BP Pulse Resp Temp SpO2   (!) 143/72 77 20 98.4 °F (36.9 °C) --      MAP       --         Physical Exam    Nursing note and vitals reviewed.  Constitutional: She appears well-developed and well-nourished. No distress.   HENT:   Head: Normocephalic and atraumatic.   Nose: Nose normal.   Mouth/Throat: Oropharynx is clear and moist.   Eyes: Conjunctivae and EOM are normal. Pupils are equal, round, and reactive to light.   Neck: Normal range of motion. Neck supple.   Cardiovascular: Normal rate, regular rhythm, normal heart sounds and intact distal pulses.   Pulmonary/Chest: Breath sounds normal. No respiratory distress. She has no wheezes.   Abdominal: Soft. Bowel sounds are normal. There is no abdominal tenderness.   Musculoskeletal: Normal range of motion.   Neurological: She is alert and oriented to person, place, and time. She has normal strength.   Skin: Skin is warm and dry.   Psychiatric: She has a normal mood and affect. Thought content normal.         ED Course   Procedures  Labs Reviewed - No data to display       Imaging Results    None                                      Clinical Impression:     ICD-10-CM ICD-9-CM   1. Migraine without status migrainosus, not intractable, unspecified migraine type  G43.909 346.90                      Disposition:   Disposition: Discharged  Condition: Stable                          Tesfaye Wyman Jr., MD  10/06/20 0899

## 2020-10-06 NOTE — ED TRIAGE NOTES
59 y.o. female presents to ER   Chief Complaint   Patient presents with    Migraine   pt c/o migraine x's 3 days to right side of head, reports hx of migraines. No acute distress noted.

## 2020-10-20 PROBLEM — Z12.11 ENCOUNTER FOR SCREENING COLONOSCOPY FOR NON-HIGH-RISK PATIENT: Status: ACTIVE | Noted: 2020-10-20

## 2020-11-19 PROBLEM — R07.9 CHEST PAIN: Status: ACTIVE | Noted: 2020-11-19

## 2021-01-11 ENCOUNTER — HOSPITAL ENCOUNTER (EMERGENCY)
Facility: HOSPITAL | Age: 61
Discharge: HOME | End: 2021-01-11
Payer: MEDICAID

## 2021-01-11 VITALS
SYSTOLIC BLOOD PRESSURE: 161 MMHG | OXYGEN SATURATION: 98 % | TEMPERATURE: 98 F | WEIGHT: 260.38 LBS | RESPIRATION RATE: 16 BRPM | BODY MASS INDEX: 44.69 KG/M2 | HEART RATE: 76 BPM | DIASTOLIC BLOOD PRESSURE: 69 MMHG

## 2021-01-11 DIAGNOSIS — Z20.822 CLOSE EXPOSURE TO COVID-19 VIRUS: Primary | ICD-10-CM

## 2021-01-11 PROCEDURE — 99284 EMERGENCY DEPT VISIT MOD MDM: CPT

## 2021-01-11 PROCEDURE — U0003 INFECTIOUS AGENT DETECTION BY NUCLEIC ACID (DNA OR RNA); SEVERE ACUTE RESPIRATORY SYNDROME CORONAVIRUS 2 (SARS-COV-2) (CORONAVIRUS DISEASE [COVID-19]), AMPLIFIED PROBE TECHNIQUE, MAKING USE OF HIGH THROUGHPUT TECHNOLOGIES AS DESCRIBED BY CMS-2020-01-R: HCPCS

## 2021-01-11 RX ORDER — BENZONATATE 100 MG/1
200 CAPSULE ORAL 3 TIMES DAILY PRN
Qty: 20 CAPSULE | Refills: 0 | Status: SHIPPED | OUTPATIENT
Start: 2021-01-11 | End: 2021-01-11 | Stop reason: SDUPTHER

## 2021-01-11 RX ORDER — AZITHROMYCIN 250 MG/1
TABLET, FILM COATED ORAL
Qty: 6 TABLET | Refills: 0 | Status: SHIPPED | OUTPATIENT
Start: 2021-01-11 | End: 2021-10-18

## 2021-01-11 RX ORDER — BENZONATATE 100 MG/1
200 CAPSULE ORAL 3 TIMES DAILY PRN
Qty: 20 CAPSULE | Refills: 0 | Status: SHIPPED | OUTPATIENT
Start: 2021-01-11 | End: 2021-01-21

## 2021-01-11 RX ORDER — AZITHROMYCIN 250 MG/1
TABLET, FILM COATED ORAL
Qty: 6 TABLET | Refills: 0 | Status: SHIPPED | OUTPATIENT
Start: 2021-01-11 | End: 2021-01-11 | Stop reason: SDUPTHER

## 2021-01-13 LAB — SARS-COV-2 RNA RESP QL NAA+PROBE: NOT DETECTED

## 2021-08-14 DIAGNOSIS — U07.1 COVID-19: Primary | ICD-10-CM

## 2021-09-24 ENCOUNTER — HOSPITAL ENCOUNTER (EMERGENCY)
Facility: HOSPITAL | Age: 61
Discharge: HOME | End: 2021-09-24
Attending: SURGERY
Payer: COMMERCIAL

## 2021-09-24 VITALS
BODY MASS INDEX: 44.41 KG/M2 | WEIGHT: 260.13 LBS | TEMPERATURE: 98 F | OXYGEN SATURATION: 99 % | RESPIRATION RATE: 16 BRPM | HEART RATE: 80 BPM | HEIGHT: 64 IN | DIASTOLIC BLOOD PRESSURE: 69 MMHG | SYSTOLIC BLOOD PRESSURE: 152 MMHG

## 2021-09-24 DIAGNOSIS — M54.9 BACK PAIN, UNSPECIFIED BACK LOCATION, UNSPECIFIED BACK PAIN LATERALITY, UNSPECIFIED CHRONICITY: Primary | ICD-10-CM

## 2021-09-24 PROCEDURE — 63600175 PHARM REV CODE 636 W HCPCS: Performed by: SURGERY

## 2021-09-24 PROCEDURE — 96372 THER/PROPH/DIAG INJ SC/IM: CPT

## 2021-09-24 PROCEDURE — 99284 EMERGENCY DEPT VISIT MOD MDM: CPT | Mod: 25

## 2021-09-24 RX ORDER — CYCLOBENZAPRINE HCL 10 MG
10 TABLET ORAL 3 TIMES DAILY PRN
Qty: 10 TABLET | Refills: 0 | Status: SHIPPED | OUTPATIENT
Start: 2021-09-24 | End: 2021-09-29

## 2021-09-24 RX ORDER — MEPERIDINE HYDROCHLORIDE 25 MG/ML
25 INJECTION INTRAMUSCULAR; INTRAVENOUS; SUBCUTANEOUS
Status: COMPLETED | OUTPATIENT
Start: 2021-09-24 | End: 2021-09-24

## 2021-09-24 RX ORDER — ONDANSETRON 2 MG/ML
4 INJECTION INTRAMUSCULAR; INTRAVENOUS
Status: COMPLETED | OUTPATIENT
Start: 2021-09-24 | End: 2021-09-24

## 2021-09-24 RX ORDER — IBUPROFEN 800 MG/1
800 TABLET ORAL EVERY 6 HOURS PRN
Qty: 20 TABLET | Refills: 0 | Status: SHIPPED | OUTPATIENT
Start: 2021-09-24 | End: 2021-10-18

## 2021-09-24 RX ADMIN — MEPERIDINE HYDROCHLORIDE 25 MG: 25 INJECTION INTRAMUSCULAR; INTRAVENOUS; SUBCUTANEOUS at 12:09

## 2021-09-24 RX ADMIN — ONDANSETRON 4 MG: 2 INJECTION INTRAMUSCULAR; INTRAVENOUS at 12:09

## 2021-10-18 PROBLEM — Z96.653 HISTORY OF TOTAL BILATERAL KNEE REPLACEMENT: Status: ACTIVE | Noted: 2021-10-18

## 2021-10-18 PROBLEM — M54.16 LUMBAR RADICULOPATHY: Status: ACTIVE | Noted: 2021-10-18

## 2021-10-18 PROBLEM — F32.9 MAJOR DEPRESSION: Status: ACTIVE | Noted: 2021-10-18

## 2022-02-05 ENCOUNTER — HOSPITAL ENCOUNTER (EMERGENCY)
Facility: HOSPITAL | Age: 62
Discharge: HOME | End: 2022-02-05
Attending: SURGERY
Payer: MEDICARE

## 2022-02-05 VITALS
WEIGHT: 258.81 LBS | BODY MASS INDEX: 44.43 KG/M2 | TEMPERATURE: 97 F | OXYGEN SATURATION: 98 % | SYSTOLIC BLOOD PRESSURE: 165 MMHG | HEART RATE: 67 BPM | DIASTOLIC BLOOD PRESSURE: 70 MMHG | RESPIRATION RATE: 17 BRPM

## 2022-02-05 DIAGNOSIS — R07.89 CHEST WALL PAIN: Primary | ICD-10-CM

## 2022-02-05 LAB
ALBUMIN SERPL BCP-MCNC: 3.5 G/DL (ref 3.5–5.2)
ALP SERPL-CCNC: 67 U/L (ref 55–135)
ALT SERPL W/O P-5'-P-CCNC: 12 U/L (ref 10–44)
ANION GAP SERPL CALC-SCNC: 10 MMOL/L (ref 8–16)
AST SERPL-CCNC: 16 U/L (ref 10–40)
BASOPHILS # BLD AUTO: 0.02 K/UL (ref 0–0.2)
BASOPHILS NFR BLD: 0.3 % (ref 0–1.9)
BILIRUB SERPL-MCNC: 0.6 MG/DL (ref 0.1–1)
BNP SERPL-MCNC: <10 PG/ML (ref 0–99)
BUN SERPL-MCNC: 14 MG/DL (ref 8–23)
CALCIUM SERPL-MCNC: 9.9 MG/DL (ref 8.7–10.5)
CHLORIDE SERPL-SCNC: 105 MMOL/L (ref 95–110)
CK MB SERPL-MCNC: 1.2 NG/ML (ref 0.1–6.5)
CK MB SERPL-MCNC: 1.2 NG/ML (ref 0.1–6.5)
CK MB SERPL-RTO: 0.6 % (ref 0–5)
CK MB SERPL-RTO: 0.7 % (ref 0–5)
CK SERPL-CCNC: 171 U/L (ref 20–180)
CK SERPL-CCNC: 171 U/L (ref 20–180)
CK SERPL-CCNC: 186 U/L (ref 20–180)
CK SERPL-CCNC: 186 U/L (ref 20–180)
CO2 SERPL-SCNC: 27 MMOL/L (ref 23–29)
CREAT SERPL-MCNC: 0.9 MG/DL (ref 0.5–1.4)
D DIMER PPP IA.FEU-MCNC: 0.64 MG/L FEU
DIFFERENTIAL METHOD: ABNORMAL
EOSINOPHIL # BLD AUTO: 0.2 K/UL (ref 0–0.5)
EOSINOPHIL NFR BLD: 3.1 % (ref 0–8)
ERYTHROCYTE [DISTWIDTH] IN BLOOD BY AUTOMATED COUNT: 15.2 % (ref 11.5–14.5)
EST. GFR  (AFRICAN AMERICAN): >60 ML/MIN/1.73 M^2
EST. GFR  (NON AFRICAN AMERICAN): >60 ML/MIN/1.73 M^2
GLUCOSE SERPL-MCNC: 114 MG/DL (ref 70–110)
HCT VFR BLD AUTO: 44.3 % (ref 37–48.5)
HGB BLD-MCNC: 14.2 G/DL (ref 12–16)
IMM GRANULOCYTES # BLD AUTO: 0.02 K/UL (ref 0–0.04)
IMM GRANULOCYTES NFR BLD AUTO: 0.3 % (ref 0–0.5)
LYMPHOCYTES # BLD AUTO: 2.2 K/UL (ref 1–4.8)
LYMPHOCYTES NFR BLD: 33 % (ref 18–48)
MCH RBC QN AUTO: 26.8 PG (ref 27–31)
MCHC RBC AUTO-ENTMCNC: 32.1 G/DL (ref 32–36)
MCV RBC AUTO: 84 FL (ref 82–98)
MONOCYTES # BLD AUTO: 0.6 K/UL (ref 0.3–1)
MONOCYTES NFR BLD: 8.5 % (ref 4–15)
NEUTROPHILS # BLD AUTO: 3.7 K/UL (ref 1.8–7.7)
NEUTROPHILS NFR BLD: 54.8 % (ref 38–73)
NRBC BLD-RTO: 0 /100 WBC
PLATELET # BLD AUTO: 238 K/UL (ref 150–450)
PMV BLD AUTO: 10.2 FL (ref 9.2–12.9)
POTASSIUM SERPL-SCNC: 3.8 MMOL/L (ref 3.5–5.1)
PROT SERPL-MCNC: 6.8 G/DL (ref 6–8.4)
RBC # BLD AUTO: 5.29 M/UL (ref 4–5.4)
SODIUM SERPL-SCNC: 142 MMOL/L (ref 136–145)
TROPONIN I SERPL DL<=0.01 NG/ML-MCNC: 0.05 NG/ML (ref 0–0.03)
TROPONIN I SERPL DL<=0.01 NG/ML-MCNC: 0.06 NG/ML (ref 0–0.03)
WBC # BLD AUTO: 6.73 K/UL (ref 3.9–12.7)

## 2022-02-05 PROCEDURE — 80053 COMPREHEN METABOLIC PANEL: CPT | Performed by: SURGERY

## 2022-02-05 PROCEDURE — 85025 COMPLETE CBC W/AUTO DIFF WBC: CPT | Performed by: SURGERY

## 2022-02-05 PROCEDURE — 93005 ELECTROCARDIOGRAM TRACING: CPT

## 2022-02-05 PROCEDURE — 83880 ASSAY OF NATRIURETIC PEPTIDE: CPT | Performed by: SURGERY

## 2022-02-05 PROCEDURE — 82553 CREATINE MB FRACTION: CPT | Mod: 91 | Performed by: SURGERY

## 2022-02-05 PROCEDURE — 93010 EKG 12-LEAD: ICD-10-PCS | Mod: ,,, | Performed by: STUDENT IN AN ORGANIZED HEALTH CARE EDUCATION/TRAINING PROGRAM

## 2022-02-05 PROCEDURE — 96374 THER/PROPH/DIAG INJ IV PUSH: CPT | Mod: 59

## 2022-02-05 PROCEDURE — 36415 COLL VENOUS BLD VENIPUNCTURE: CPT | Performed by: SURGERY

## 2022-02-05 PROCEDURE — 93010 ELECTROCARDIOGRAM REPORT: CPT | Mod: ,,, | Performed by: STUDENT IN AN ORGANIZED HEALTH CARE EDUCATION/TRAINING PROGRAM

## 2022-02-05 PROCEDURE — 63600175 PHARM REV CODE 636 W HCPCS: Performed by: SURGERY

## 2022-02-05 PROCEDURE — 85379 FIBRIN DEGRADATION QUANT: CPT | Performed by: SURGERY

## 2022-02-05 PROCEDURE — 25500020 PHARM REV CODE 255: Performed by: SURGERY

## 2022-02-05 PROCEDURE — 84484 ASSAY OF TROPONIN QUANT: CPT | Performed by: SURGERY

## 2022-02-05 PROCEDURE — 99285 EMERGENCY DEPT VISIT HI MDM: CPT | Mod: 25

## 2022-02-05 PROCEDURE — 25000003 PHARM REV CODE 250: Performed by: SURGERY

## 2022-02-05 RX ORDER — TRAMADOL HYDROCHLORIDE 50 MG/1
50 TABLET ORAL EVERY 6 HOURS PRN
Qty: 7 TABLET | Refills: 0 | Status: SHIPPED | OUTPATIENT
Start: 2022-02-05 | End: 2022-02-07

## 2022-02-05 RX ORDER — ASPIRIN 325 MG
325 TABLET ORAL
Status: COMPLETED | OUTPATIENT
Start: 2022-02-05 | End: 2022-02-05

## 2022-02-05 RX ORDER — TIZANIDINE HYDROCHLORIDE 4 MG/1
4 CAPSULE, GELATIN COATED ORAL 4 TIMES DAILY PRN
Qty: 10 CAPSULE | Refills: 0 | Status: SHIPPED | OUTPATIENT
Start: 2022-02-05 | End: 2022-02-15

## 2022-02-05 RX ORDER — KETOROLAC TROMETHAMINE 30 MG/ML
30 INJECTION, SOLUTION INTRAMUSCULAR; INTRAVENOUS
Status: COMPLETED | OUTPATIENT
Start: 2022-02-05 | End: 2022-02-05

## 2022-02-05 RX ADMIN — ASPIRIN 325 MG ORAL TABLET 325 MG: 325 PILL ORAL at 09:02

## 2022-02-05 RX ADMIN — KETOROLAC TROMETHAMINE 30 MG: 30 INJECTION, SOLUTION INTRAMUSCULAR at 01:02

## 2022-02-05 RX ADMIN — IOHEXOL 100 ML: 300 INJECTION, SOLUTION INTRAVENOUS at 10:02

## 2022-02-05 NOTE — ED TRIAGE NOTES
61 y.o. female presents to ER Room/bed info not found   Chief Complaint   Patient presents with    Chest Pain   .   Pt reports chest pain for a week

## 2022-02-05 NOTE — ED PROVIDER NOTES
[facility]                                                 I reviewed the ER triage nurse's note before evaluating the patient    Chief Complaint  61 y.o. female with Chest Pain    History of Present Illness  Christina Townsend presents to the emergency room with chest wall pain  Patient has had on again off again left pectoral pain for last 5 days per history  Patient states any movement activity of the left chest wall exacerbates the pain  Patient denies any trauma fall; patient denies any cardiac history on interview  Patient has left anterior chest wall pain is entirely reproducible on ER exam  Clear lung sounds, no bruising, no other signs of acute pathology noted now    The history is provided by the patient  Previous medical records were obtained from [facility]  Previous records are summarized from prior ER visits and hospitalizations   Medical history: Bronchitis, morbid obesity, RA  Surgeries: Knee arthroplasty, carpal tunnel, hysterectomy, joint, BTL  Allergies: Lortab  No significant family history  No significant social history, nonsmoker    I have reviewed all of this patient's past medical, surgical, family, and social   histories as well as active allergies and medications documented in the  electronic medical record    Review of Systems and Physical Exam      Review of Systems (all other ROS are otherwise negative)  -- Constitution - no fever, denies fatigue, no weakness, no chills, night sweats  -- Eyes - no tearing or redness, no visual disturbance  -- Ear, Nose - no tinnitus or earache, no nasal congestion or discharge  -- Mouth,Throat - no sore throat, no toothache, normal voice, normal swallowing  -- Respiratory - denies cough and congestion, no shortness of breath, no TRUJILLO  -- Cardiovascular - denies cardiac pain, no palpitations, denies claudication  -- Gastrointestinal - denies abdominal pain, nausea, vomiting, or diarrhea  -- Genitourinary - no dysuria, no hematuria, no  flank pain, no bladder pain  -- Musculoskeletal - left chest wall pain, negative for trauma or injury  -- Neurological - no headache, no numbness, tingling, seizure, balance issues  -- Hematologic- no bruising, no bleeding, nose bleed, bleeding disorders  -- Lymphatics - no leg swelling, no tender nodes, no swollen nodes or LAD    Vital Signs (reviewed by the physician)  Her temperature is 97.3 °F (36.3 °C).   Her blood pressure is 150/65  and her pulse is 84.   Her respiration is 20 and oxygen saturation is 96%.     Physical Exam  -- Nursing note and vitals reviewed  -- Constitutional: Appears well-developed and well-nourished  -- Head: Atraumatic. Normocephalic. No obvious abnormality  -- Eyes: Pupils are equal and reactive to light. Normal conjunctiva and lids  -- Neck: Normal range of motion. Neck supple. No masses, trachea midline  -- Cardiac: Normal rate, regular rhythm and normal heart sounds  -- Respiratory: Normal respiratory effort, breath sounds clear to auscultation  -- Gastrointestinal: Soft, no tenderness. Normal bowel sounds. Normal liver edge  -- Musculoskeletal: Reproducible left pectoral pain, muscle spasm on exam  -- Neurological: No focal deficits. Showed good interaction with staff  -- Vascular: Posterior tibial, dorsalis pedis and radial pulses 2+ bilaterally      Emergency Room Course      Lab Results (reviewed by the physician)     K 3.8      CO2 27   BUN 14   CREATININE 0.9    (H)   ALKPHOS 67   AST 16   ALT 12   BILITOT 0.6   ALBUMIN 3.5   PROT 6.8   WBC 6.73   HGB 14.2   HCT 44.3            CPKMB 1.2   TROPONINI 0.054 (H)   BNP <10   DDIMER 0.64 (H)     EKG (interpreted by the physician)  Overall Interpretation: normal rate and rhythm with no obvious ischemic changes  Normal sinus rhythm @ 84 bpm  No ST elevation or depression  No arrhythmia or QRS change noted  Similar when compared to previous EKG    Radiology (images visualized & reports reviewed  by the physician)  -- Chest x-ray showed no infiltrate and showed no acute pathology   -- The PE CT was negative for pulmonary embolism or aortic dissection     Additional Work up (reviewed by the physician)  -- the 1st troponin was 0.061  -- the second troponin was 0.054    Medications Given  aspirin tablet 325 mg (325 mg Oral Given 2/5/22 0917)   iohexoL (OMNIPAQUE 300) injection 100 mL (100 mLs Intravenous Given 2/5/22 1052)   ketorolac injection 30 mg (30 mg Intravenous Given 2/5/22 1303)     Medical Decision Making     ED Course/ED Management  -- patient presented with left pectoral pain for last 5 to 6 days, reproducible on exam  -- precautionary negative cardiac workup including EKG and 2 sets of troponins now  -- slightly elevated D-dimer with a CT PE study showing no pulmonary embolism  -- this pain is worse with movement of the left arm, no obvious breast mass noted  -- IV Toradol dissipated the left chest wall pain in the emergency room today  -- the workup was otherwise negative, prescription of Ultram and Zanaflex on DC  -- patient counseled to limit activity with close follow-up with PCP as an outpatient  -- patient has missed a recent mammogram, encouraged to get this completed ASAP  -- pain-free on discharge, will see Cardiology as a matter of routine follow-up  -- return with any concerns, patient carefully counseled to return with any pain    Assessment, Disposition, & Plan      Diagnosis  [R07.89] Chest wall pain (Primary)    Disposition and Plan  -- Disposition: home  -- Condition: stable  -- Follow-up: Patient to follow up with Marc Chinchilla MD in 1-2 days.  -- I advised the patient that we have found no life threatening condition today  -- At this time, I believe the patient is clinically stable for discharge.   -- Pt understands that the visit today is to address immediate concerns   -- Further workup and evaluation as an outpatient may be required  -- The patient acknowledges that close  follow up with a MD is required   -- Patient agrees to comply with all instruction and direction given in the ER    This note is dictated on M*Modal word recognition program.  There are word recognition mistakes that are occasionally missed on review.         Phi Covington MD  02/05/22 4053

## 2022-02-27 ENCOUNTER — HOSPITAL ENCOUNTER (EMERGENCY)
Facility: HOSPITAL | Age: 62
Discharge: HOME | End: 2022-02-27
Attending: STUDENT IN AN ORGANIZED HEALTH CARE EDUCATION/TRAINING PROGRAM
Payer: MEDICARE

## 2022-02-27 VITALS
OXYGEN SATURATION: 98 % | BODY MASS INDEX: 44.58 KG/M2 | SYSTOLIC BLOOD PRESSURE: 134 MMHG | WEIGHT: 259.69 LBS | DIASTOLIC BLOOD PRESSURE: 78 MMHG | TEMPERATURE: 98 F | HEART RATE: 71 BPM | RESPIRATION RATE: 18 BRPM

## 2022-02-27 DIAGNOSIS — R51.9 NONINTRACTABLE HEADACHE, UNSPECIFIED CHRONICITY PATTERN, UNSPECIFIED HEADACHE TYPE: Primary | ICD-10-CM

## 2022-02-27 PROCEDURE — 96374 THER/PROPH/DIAG INJ IV PUSH: CPT

## 2022-02-27 PROCEDURE — 99284 EMERGENCY DEPT VISIT MOD MDM: CPT | Mod: 25

## 2022-02-27 PROCEDURE — 96361 HYDRATE IV INFUSION ADD-ON: CPT

## 2022-02-27 PROCEDURE — 96372 THER/PROPH/DIAG INJ SC/IM: CPT | Mod: 59

## 2022-02-27 PROCEDURE — 25000003 PHARM REV CODE 250: Performed by: STUDENT IN AN ORGANIZED HEALTH CARE EDUCATION/TRAINING PROGRAM

## 2022-02-27 PROCEDURE — 63600175 PHARM REV CODE 636 W HCPCS: Performed by: STUDENT IN AN ORGANIZED HEALTH CARE EDUCATION/TRAINING PROGRAM

## 2022-02-27 PROCEDURE — 96375 TX/PRO/DX INJ NEW DRUG ADDON: CPT

## 2022-02-27 RX ORDER — DIPHENHYDRAMINE HYDROCHLORIDE 50 MG/ML
25 INJECTION INTRAMUSCULAR; INTRAVENOUS
Status: COMPLETED | OUTPATIENT
Start: 2022-02-27 | End: 2022-02-27

## 2022-02-27 RX ORDER — METOCLOPRAMIDE HYDROCHLORIDE 5 MG/ML
10 INJECTION INTRAMUSCULAR; INTRAVENOUS
Status: COMPLETED | OUTPATIENT
Start: 2022-02-27 | End: 2022-02-27

## 2022-02-27 RX ORDER — KETOROLAC TROMETHAMINE 30 MG/ML
15 INJECTION, SOLUTION INTRAMUSCULAR; INTRAVENOUS
Status: COMPLETED | OUTPATIENT
Start: 2022-02-27 | End: 2022-02-27

## 2022-02-27 RX ADMIN — METOCLOPRAMIDE 10 MG: 5 INJECTION, SOLUTION INTRAMUSCULAR; INTRAVENOUS at 11:02

## 2022-02-27 RX ADMIN — SODIUM CHLORIDE 1000 ML: 0.9 INJECTION, SOLUTION INTRAVENOUS at 11:02

## 2022-02-27 RX ADMIN — KETOROLAC TROMETHAMINE 15 MG: 30 INJECTION, SOLUTION INTRAMUSCULAR at 10:02

## 2022-02-27 RX ADMIN — DIPHENHYDRAMINE HYDROCHLORIDE 25 MG: 50 INJECTION, SOLUTION INTRAMUSCULAR; INTRAVENOUS at 11:02

## 2022-02-27 NOTE — DISCHARGE INSTRUCTIONS
Please follow up with your primary care physician within 2 days. Ensure that you review all lab work results and imaging results. If you have any questions about your discharge paperwork please call the Emergency Department.     Return to the ED for any headache, vision changes, dizziness, lightheadedness, nausea, vomiting, speech changes, numbness or tingling in extremitites, or any new or worsening symptoms.    Thank you for visiting [facility], Department of Emergency Medicine. Please see the entirety of the educational materials provided. Please note that a visit to the emergency department does not substitute ongoing care from a primary medical provider or specialist. Please ensure to follow up as recommended. However, please return to the emergency department immediately if symptoms do not improve as discussed, symptoms worsen, new symptoms develop, difficulty in following up or for any of your concerns or issues. Please note on discharge you are acknowledging understanding and agreement on medical evaluation, management recommendations and follow up recommendations.

## 2022-02-27 NOTE — ED PROVIDER NOTES
"Encounter Date: 2/27/2022       History     Chief Complaint   Patient presents with    Headache     C/o headache x 3 days. Reports photosensitivity. Denies trauma. Denies nausea or vomiting.      61-year-old female with history of migraines, rheumatoid arthritis, presenting with 3 days of headache.  Patient reports this feels identical to her prior migraines.  Reports some photosensitivity.  Denies fever, neck pain, neck stiffness, nausea, vomiting, numbness, weakness, confusion.  Patient denies sudden onset or that this is the worst headache of her life.  No other complaints.        Review of patient's allergies indicates:   Allergen Reactions    Lortab [hydrocodone-acetaminophen] Itching     STATES "CAN'T TAKE LORTAB 5 MG,BUT CAN TAKE LORTAB 10 MG     Past Medical History:   Diagnosis Date    Back pain     Bronchitis     Contact dermatitis     Corns and callosities     Gout     Lymphedema of both lower extremities     Morbid obesity     RA (rheumatoid arthritis)     Tinea pedis of right foot      Past Surgical History:   Procedure Laterality Date    CARPAL TUNNEL RELEASE  2009    left    COLONOSCOPY N/A 10/20/2020    Procedure: COLONOSCOPY;  Surgeon: Luis A Luu MD;  Location: [facility];  Service: General;  Laterality: N/A;    HYSTERECTOMY      JOINT REPLACEMENT      partial hysterctomy  2010    SHOULDER SURGERY      TOTAL KNEE ARTHROPLASTY  4/4/11    left    TUBAL LIGATION       Family History   Problem Relation Age of Onset    Cancer Mother         Breast (?cured), then liver and bone    Hypertension Mother     Hypertension Father     Stroke Father     Hypertension Sister     Hypertension Brother      Social History     Tobacco Use    Smoking status: Current Every Day Smoker     Packs/day: 1.00     Years: 30.00     Pack years: 30.00     Types: Cigarettes    Smokeless tobacco: Never Used   Substance Use Topics    Alcohol use: Yes     Comment: occasional light drinker    Drug use: " No     Review of Systems   Constitutional: Negative for fever.   HENT: Negative for sore throat.    Eyes: Positive for visual disturbance.   Respiratory: Negative for shortness of breath.    Cardiovascular: Negative for chest pain.   Gastrointestinal: Negative for nausea.   Genitourinary: Negative for dysuria.   Musculoskeletal: Negative for back pain.   Skin: Negative for rash.   Neurological: Positive for headaches. Negative for weakness, light-headedness and numbness.   Hematological: Does not bruise/bleed easily.       Physical Exam     Initial Vitals [02/27/22 1039]   BP Pulse Resp Temp SpO2   (!) 172/74 78 16 98.2 °F (36.8 °C) 95 %      MAP       --         Physical Exam    Nursing note and vitals reviewed.  Constitutional: She appears well-developed. She is not diaphoretic. No distress.   HENT:   Head: Normocephalic.   Eyes: Pupils are equal, round, and reactive to light.   Neck:   Normal range of motion.  Cardiovascular:   No murmur heard.  Pulmonary/Chest: No respiratory distress.   Abdominal: Abdomen is soft.   Musculoskeletal:         General: No edema.      Cervical back: Normal range of motion.     Neurological: She is alert.   Alert and oriented to person place and time. No facial droop. CN 2-12 intact. Fluent speech with expression and comprehension. Strength 5/5 and sensation intact in upper and lower extremities. Sensation present and equal in both sides of face.    Skin: Skin is warm.   Psychiatric: She has a normal mood and affect.         ED Course   Procedures  Labs Reviewed - No data to display       Imaging Results    None          Medications   ketorolac injection 15 mg (15 mg Intramuscular Given 2/27/22 1059)   sodium chloride 0.9% bolus 1,000 mL (0 mLs Intravenous Stopped 2/27/22 1201)   metoclopramide HCl injection 10 mg (10 mg Intravenous Given 2/27/22 1102)   diphenhydrAMINE injection 25 mg (25 mg Intravenous Given 2/27/22 1100)     Medical Decision Making:   Differential Diagnosis:    DDX: Headache - Unlikely ICH/stroke as no risk factors, no trauma, and nonfocal neuro exam. Unlikely meningitis given no neck stiffness, meningeal signs, fever, or risk factors. More likely migraine given similar to prior headaches, no focal deficits, no alarm symptoms.  DX: Defer. Consider imaging if pain persistently severe despite treatments or change in neuro exam.   TX: Analgesia PRN. Antiemetic PRN. IVF.   DISPO: Pending reassessment. If studies WNL, symptoms controlled, exam unchanged, follow up with PMD +/- neurology within 2 days with precautions for RTED, supportive care recommendations.               ED Course as of 02/27/22 1211   Sun Feb 27, 2022   1136 Patient reports resolution of headache.  Requesting to be discharged.  No complaints at this time. [NB]      ED Course User Index  [NB] Eric Schaffer MD             Clinical Impression:   Final diagnoses:  [R51.9] Nonintractable headache, unspecified chronicity pattern, unspecified headache type (Primary)          ED Disposition Condition    Discharge Stable        ED Prescriptions     None        Follow-up Information     Follow up With Specialties Details Why Contact Info    Marc Chinchilla MD Family Medicine Schedule an appointment as soon as possible for a visit in 2 days  [address]  [place] [address]  [phone]      [facility] - Emergency Dept Emergency Medicine  If symptoms worsen [address] [facility] [address]  [phone]           Eric Schaffer MD  02/27/22 1052       Eric Schaffer MD  02/27/22 1211

## 2022-02-28 ENCOUNTER — PES CALL (OUTPATIENT)
Dept: ADMINISTRATIVE | Facility: CLINIC | Age: 62
End: 2022-02-28
Payer: MEDICARE

## 2022-04-20 ENCOUNTER — HOSPITAL ENCOUNTER (EMERGENCY)
Facility: HOSPITAL | Age: 62
Discharge: HOME | End: 2022-04-20
Attending: SURGERY
Payer: MEDICARE

## 2022-04-20 VITALS
HEIGHT: 64 IN | OXYGEN SATURATION: 97 % | TEMPERATURE: 99 F | DIASTOLIC BLOOD PRESSURE: 58 MMHG | WEIGHT: 256 LBS | SYSTOLIC BLOOD PRESSURE: 113 MMHG | RESPIRATION RATE: 18 BRPM | BODY MASS INDEX: 43.71 KG/M2 | HEART RATE: 86 BPM

## 2022-04-20 DIAGNOSIS — J40 BRONCHITIS: ICD-10-CM

## 2022-04-20 DIAGNOSIS — R50.9 FEVER: ICD-10-CM

## 2022-04-20 DIAGNOSIS — R68.83 CHILLS: Primary | ICD-10-CM

## 2022-04-20 LAB
ALBUMIN SERPL BCP-MCNC: 3.5 G/DL (ref 3.5–5.2)
ALP SERPL-CCNC: 74 U/L (ref 55–135)
ALT SERPL W/O P-5'-P-CCNC: 15 U/L (ref 10–44)
ANION GAP SERPL CALC-SCNC: 11 MMOL/L (ref 8–16)
AST SERPL-CCNC: 17 U/L (ref 10–40)
BASOPHILS # BLD AUTO: 0.02 K/UL (ref 0–0.2)
BASOPHILS NFR BLD: 0.2 % (ref 0–1.9)
BILIRUB SERPL-MCNC: 0.5 MG/DL (ref 0.1–1)
BILIRUB UR QL STRIP: NEGATIVE
BUN SERPL-MCNC: 15 MG/DL (ref 8–23)
CALCIUM SERPL-MCNC: 9.9 MG/DL (ref 8.7–10.5)
CHLORIDE SERPL-SCNC: 103 MMOL/L (ref 95–110)
CK MB SERPL-MCNC: 0.5 NG/ML (ref 0.1–6.5)
CK MB SERPL-RTO: 0.3 % (ref 0–5)
CK SERPL-CCNC: 152 U/L (ref 20–180)
CK SERPL-CCNC: 152 U/L (ref 20–180)
CLARITY UR: CLEAR
CO2 SERPL-SCNC: 28 MMOL/L (ref 23–29)
COLOR UR: YELLOW
CREAT SERPL-MCNC: 1 MG/DL (ref 0.5–1.4)
DIFFERENTIAL METHOD: ABNORMAL
EOSINOPHIL # BLD AUTO: 0.1 K/UL (ref 0–0.5)
EOSINOPHIL NFR BLD: 1 % (ref 0–8)
ERYTHROCYTE [DISTWIDTH] IN BLOOD BY AUTOMATED COUNT: 15.2 % (ref 11.5–14.5)
EST. GFR  (AFRICAN AMERICAN): >60 ML/MIN/1.73 M^2
EST. GFR  (NON AFRICAN AMERICAN): >60 ML/MIN/1.73 M^2
GLUCOSE SERPL-MCNC: 94 MG/DL (ref 70–110)
GLUCOSE UR QL STRIP: NEGATIVE
GROUP A STREP, MOLECULAR: NEGATIVE
HCT VFR BLD AUTO: 45.5 % (ref 37–48.5)
HGB BLD-MCNC: 14.4 G/DL (ref 12–16)
HGB UR QL STRIP: ABNORMAL
IMM GRANULOCYTES # BLD AUTO: 0.04 K/UL (ref 0–0.04)
IMM GRANULOCYTES NFR BLD AUTO: 0.4 % (ref 0–0.5)
INFLUENZA A, MOLECULAR: NEGATIVE
INFLUENZA B, MOLECULAR: NEGATIVE
KETONES UR QL STRIP: NEGATIVE
LACTATE SERPL-SCNC: 1.1 MMOL/L (ref 0.5–2.2)
LACTATE SERPL-SCNC: 1.3 MMOL/L (ref 0.5–2.2)
LEUKOCYTE ESTERASE UR QL STRIP: NEGATIVE
LIPASE SERPL-CCNC: 7 U/L (ref 4–60)
LYMPHOCYTES # BLD AUTO: 1.2 K/UL (ref 1–4.8)
LYMPHOCYTES NFR BLD: 11.7 % (ref 18–48)
MCH RBC QN AUTO: 26.4 PG (ref 27–31)
MCHC RBC AUTO-ENTMCNC: 31.6 G/DL (ref 32–36)
MCV RBC AUTO: 83 FL (ref 82–98)
MONOCYTES # BLD AUTO: 0.3 K/UL (ref 0.3–1)
MONOCYTES NFR BLD: 3.1 % (ref 4–15)
NEUTROPHILS # BLD AUTO: 8.6 K/UL (ref 1.8–7.7)
NEUTROPHILS NFR BLD: 83.6 % (ref 38–73)
NITRITE UR QL STRIP: NEGATIVE
NRBC BLD-RTO: 0 /100 WBC
PH UR STRIP: 6 [PH] (ref 5–8)
PLATELET # BLD AUTO: 249 K/UL (ref 150–450)
PMV BLD AUTO: 10.2 FL (ref 9.2–12.9)
POTASSIUM SERPL-SCNC: 4.1 MMOL/L (ref 3.5–5.1)
PROCALCITONIN SERPL IA-MCNC: 0.08 NG/ML
PROT SERPL-MCNC: 7.2 G/DL (ref 6–8.4)
PROT UR QL STRIP: NEGATIVE
RBC # BLD AUTO: 5.46 M/UL (ref 4–5.4)
SARS-COV-2 RDRP RESP QL NAA+PROBE: NEGATIVE
SODIUM SERPL-SCNC: 142 MMOL/L (ref 136–145)
SP GR UR STRIP: 1.01 (ref 1–1.03)
SPECIMEN SOURCE: NORMAL
TROPONIN I SERPL DL<=0.01 NG/ML-MCNC: <0.006 NG/ML (ref 0–0.03)
URN SPEC COLLECT METH UR: ABNORMAL
UROBILINOGEN UR STRIP-ACNC: NEGATIVE EU/DL
WBC # BLD AUTO: 10.26 K/UL (ref 3.9–12.7)

## 2022-04-20 PROCEDURE — 84145 PROCALCITONIN (PCT): CPT | Performed by: SURGERY

## 2022-04-20 PROCEDURE — 96365 THER/PROPH/DIAG IV INF INIT: CPT

## 2022-04-20 PROCEDURE — 82553 CREATINE MB FRACTION: CPT | Performed by: SURGERY

## 2022-04-20 PROCEDURE — 84484 ASSAY OF TROPONIN QUANT: CPT | Performed by: SURGERY

## 2022-04-20 PROCEDURE — 25000003 PHARM REV CODE 250: Performed by: SURGERY

## 2022-04-20 PROCEDURE — U0002 COVID-19 LAB TEST NON-CDC: HCPCS | Performed by: SURGERY

## 2022-04-20 PROCEDURE — 99285 EMERGENCY DEPT VISIT HI MDM: CPT | Mod: 25

## 2022-04-20 PROCEDURE — 83690 ASSAY OF LIPASE: CPT | Performed by: SURGERY

## 2022-04-20 PROCEDURE — 36415 COLL VENOUS BLD VENIPUNCTURE: CPT | Performed by: SURGERY

## 2022-04-20 PROCEDURE — 87651 STREP A DNA AMP PROBE: CPT | Performed by: SURGERY

## 2022-04-20 PROCEDURE — 81003 URINALYSIS AUTO W/O SCOPE: CPT | Performed by: SURGERY

## 2022-04-20 PROCEDURE — 87040 BLOOD CULTURE FOR BACTERIA: CPT | Mod: 59 | Performed by: SURGERY

## 2022-04-20 PROCEDURE — 87502 INFLUENZA DNA AMP PROBE: CPT | Performed by: SURGERY

## 2022-04-20 PROCEDURE — 93010 ELECTROCARDIOGRAM REPORT: CPT | Mod: ,,, | Performed by: INTERNAL MEDICINE

## 2022-04-20 PROCEDURE — 93010 EKG 12-LEAD: ICD-10-PCS | Mod: ,,, | Performed by: INTERNAL MEDICINE

## 2022-04-20 PROCEDURE — 85025 COMPLETE CBC W/AUTO DIFF WBC: CPT | Performed by: SURGERY

## 2022-04-20 PROCEDURE — 83605 ASSAY OF LACTIC ACID: CPT | Performed by: SURGERY

## 2022-04-20 PROCEDURE — 80053 COMPREHEN METABOLIC PANEL: CPT | Performed by: SURGERY

## 2022-04-20 PROCEDURE — 63600175 PHARM REV CODE 636 W HCPCS: Performed by: SURGERY

## 2022-04-20 PROCEDURE — 93005 ELECTROCARDIOGRAM TRACING: CPT

## 2022-04-20 RX ORDER — METHYLPREDNISOLONE 4 MG/1
TABLET ORAL
Qty: 1 EACH | Refills: 0 | Status: SHIPPED | OUTPATIENT
Start: 2022-04-20 | End: 2023-08-18 | Stop reason: ALTCHOICE

## 2022-04-20 RX ORDER — LEVOFLOXACIN 5 MG/ML
750 INJECTION, SOLUTION INTRAVENOUS
Status: COMPLETED | OUTPATIENT
Start: 2022-04-20 | End: 2022-04-20

## 2022-04-20 RX ORDER — LEVOFLOXACIN 500 MG/1
500 TABLET, FILM COATED ORAL DAILY
Qty: 7 TABLET | Refills: 0 | Status: SHIPPED | OUTPATIENT
Start: 2022-04-20 | End: 2022-04-27

## 2022-04-20 RX ORDER — BENZONATATE 100 MG/1
200 CAPSULE ORAL 3 TIMES DAILY PRN
Qty: 20 CAPSULE | Refills: 0 | Status: SHIPPED | OUTPATIENT
Start: 2022-04-20 | End: 2022-04-30

## 2022-04-20 RX ORDER — IBUPROFEN 800 MG/1
800 TABLET ORAL
Status: COMPLETED | OUTPATIENT
Start: 2022-04-20 | End: 2022-04-20

## 2022-04-20 RX ORDER — ACETAMINOPHEN 500 MG
1000 TABLET ORAL
Status: COMPLETED | OUTPATIENT
Start: 2022-04-20 | End: 2022-04-20

## 2022-04-20 RX ADMIN — IBUPROFEN 800 MG: 800 TABLET, FILM COATED ORAL at 08:04

## 2022-04-20 RX ADMIN — SODIUM CHLORIDE 1000 ML: 0.9 INJECTION, SOLUTION INTRAVENOUS at 10:04

## 2022-04-20 RX ADMIN — LEVOFLOXACIN 750 MG: 750 INJECTION, SOLUTION INTRAVENOUS at 10:04

## 2022-04-20 RX ADMIN — ACETAMINOPHEN 1000 MG: 500 TABLET ORAL at 08:04

## 2022-04-20 NOTE — ED TRIAGE NOTES
61 y.o. female presents to ER Room/bed info not found   Chief Complaint   Patient presents with    Chills   .   Pt c/o chills, cough, body aches starting this morning

## 2022-04-20 NOTE — ED NOTES
61 y.o. female presents to ER ED 04/ED 04   Chief Complaint   Patient presents with    Chills   Pt arrived to ED c/o having body aches,chills, headache , and cough that started this morning. Pt states that she took the pneumonia, shingles, and COVID booster vaccines on 4/14/22

## 2022-04-20 NOTE — ED PROVIDER NOTES
"Encounter Date: 4/20/2022       History     Chief Complaint   Patient presents with    Chills     Christina Townsend is a 61 y.o. female presents with chills and cough today  Patient with sore throat cough, longstanding smoking history noted today in ER  Patient with body aches and chills, patient with flu-like symptoms on evaluation   No hypotension, no tachycardia, patient has febrile on ER triage this morning  Denies any sputum production, no obvious symptoms of COVID on ER exam        Review of patient's allergies indicates:   Allergen Reactions    Lortab [hydrocodone-acetaminophen] Itching     STATES "CAN'T TAKE LORTAB 5 MG,BUT CAN TAKE LORTAB 10 MG     Past Medical History:   Diagnosis Date    Back pain     Bronchitis     Contact dermatitis     Corns and callosities     Gout     Lymphedema of both lower extremities     Morbid obesity     RA (rheumatoid arthritis)     Tinea pedis of right foot      Past Surgical History:   Procedure Laterality Date    CARPAL TUNNEL RELEASE  2009    left    COLONOSCOPY N/A 10/20/2020    Procedure: COLONOSCOPY;  Surgeon: Luis A Luu MD;  Location: [facility];  Service: General;  Laterality: N/A;    HYSTERECTOMY      JOINT REPLACEMENT      partial hysterctomy  2010    SHOULDER SURGERY      TOTAL KNEE ARTHROPLASTY  4/4/11    left    TUBAL LIGATION       Family History   Problem Relation Age of Onset    Cancer Mother         Breast (?cured), then liver and bone    Hypertension Mother     Hypertension Father     Stroke Father     Hypertension Sister     Hypertension Brother      Social History     Tobacco Use    Smoking status: Current Every Day Smoker     Packs/day: 1.00     Years: 30.00     Pack years: 30.00     Types: Cigarettes    Smokeless tobacco: Never Used   Substance Use Topics    Alcohol use: Yes     Comment: occasional light drinker    Drug use: No     Review of Systems   Constitutional: Positive for chills, fatigue and fever.   HENT: " Positive for sinus pain and sore throat.    Eyes: Negative.    Respiratory: Positive for cough. Negative for shortness of breath.    Cardiovascular: Negative.    Gastrointestinal: Negative.    Genitourinary: Negative.    Musculoskeletal: Negative.    Skin: Negative.    Neurological: Negative.    Psychiatric/Behavioral: Negative.        Physical Exam     Initial Vitals   BP Pulse Resp Temp SpO2   04/20/22 0804 04/20/22 0803 04/20/22 0757 04/20/22 0757 04/20/22 0803   (!) 159/81 95 18 (!) 102.2 °F (39 °C) 96 %      MAP       --                Physical Exam    Nursing note and vitals reviewed.  Constitutional: She appears well-developed and well-nourished.   HENT:   Head: Normocephalic and atraumatic.   Right Ear: External ear normal.   Left Ear: External ear normal.   Nose: Nose normal.   Mouth/Throat: Oropharynx is clear and moist.   Eyes: Conjunctivae and EOM are normal. Pupils are equal, round, and reactive to light.   Neck:   Normal range of motion.  Cardiovascular: Normal rate, regular rhythm, normal heart sounds and intact distal pulses.   Pulmonary/Chest:   faint rhonchi at the bilateral bases with no active wheezing    Abdominal: Abdomen is soft.   Musculoskeletal:         General: Normal range of motion.      Cervical back: Normal range of motion.     Neurological: She is alert and oriented to person, place, and time.   Skin: Skin is warm. Capillary refill takes less than 2 seconds.         ED Course   Procedures  Labs Reviewed   URINALYSIS, REFLEX TO URINE CULTURE - Abnormal; Notable for the following components:       Result Value    Occult Blood UA Trace (*)     All other components within normal limits    Narrative:     Specimen Source->Urine   CBC W/ AUTO DIFFERENTIAL - Abnormal; Notable for the following components:    RBC 5.46 (*)     MCH 26.4 (*)     MCHC 31.6 (*)     RDW 15.2 (*)     Gran # (ANC) 8.6 (*)     Gran % 83.6 (*)     Lymph % 11.7 (*)     Mono % 3.1 (*)     All other components within  normal limits   INFLUENZA A & B BY MOLECULAR   GROUP A STREP, MOLECULAR   CULTURE, BLOOD   CULTURE, BLOOD   SARS-COV-2 RNA AMPLIFICATION, QUAL   LACTIC ACID, PLASMA   LIPASE   COMPREHENSIVE METABOLIC PANEL   TROPONIN I   CK   CK-MB   PROCALCITONIN   LACTIC ACID, PLASMA   LACTIC ACID, PLASMA     EKG Readings: (Independently Interpreted)   Initial Reading: No STEMI. Previous EKG: Compared with most recent EKG Rhythm: Normal Sinus Rhythm. Heart Rate: 86. Ectopy: No Ectopy. Conduction: Normal. ST Segments: Normal ST Segments. T Waves: Normal. Axis: Normal.       Imaging Results          X-Ray Chest 1 View (Final result)  Result time 04/20/22 08:37:36    Final result by Julianna French MD (04/20/22 08:37:36)                 Impression:      No acute abnormality.      Electronically signed by: Julianna French MD  Date:    04/20/2022  Time:    08:37             Narrative:    EXAMINATION:  XR CHEST 1 VIEW    CLINICAL HISTORY:  cough;    TECHNIQUE:  Single frontal view of the chest was performed.    COMPARISON:  02/05/2022    FINDINGS:  The lungs are clear with normal appearance of pulmonary vasculature. No pleural effusion. No evident pneumothorax.    The cardiac silhouette is normal in size. The hilar and mediastinal contours are unremarkable.    Bones are intact.                                 Medications   acetaminophen tablet 1,000 mg (1,000 mg Oral Given 4/20/22 0812)   ibuprofen tablet 800 mg (800 mg Oral Given 4/20/22 0812)   sodium chloride 0.9% bolus 1,000 mL (0 mLs Intravenous Stopped 4/20/22 1104)   levoFLOXacin 750 mg/150 mL IVPB 750 mg (0 mg Intravenous Stopped 4/20/22 1139)     Medical Decision Making:   Initial Assessment:   Fever and body aches today  Cough and sore throat today at home  Patient is long-time smoker with no sputum production  Patient has no exposure to COVID per ER interview today  Body aches and fatigue, 103° Fahrenheit temperature on triage    Differential Diagnosis:   Flu, strep, COVID,  bronchitis, pneumonia, UTI, infectious process    Clinical Tests:   Lab Tests: Ordered and Reviewed  Radiological Study: Ordered and Reviewed  Medical Tests: Ordered and Reviewed    ED Management:  Patient with significant fever on evaluation with cough and cold symptoms  Normal white count, normal lactic acid, normal procalcitonin on ER assessment today  Negative swabs on ER assessment today, negative urinalysis on ER assessment today  Clear chest x-ray in the ER today, supple neck, no signs of meningitis on evaluation  Patient has been coughing with a sore throat, IV fluids, antipyretics given the ER as well  Fever completely dissipated, repeat lactic acid was normal in the emergency room as well  Due to cough and cold in the setting of the smoker, IV Levaquin administered in the ER today  Prescription for Levaquin given on ER discharge today, steroid pack and cough Rx as well  Fluids Tylenol and Motrin at home with PCP follow-up within the next 48 hours outpatient  I extensively counseled this patient to return to the ER with any concerns after discharge today                      Clinical Impression:   Final diagnoses:  [R50.9] Fever  [R68.83] Chills (Primary)  [J40] Bronchitis          ED Disposition Condition    Discharge Stable        ED Prescriptions     Medication Sig Dispense Start Date End Date Auth. Provider    levoFLOXacin (LEVAQUIN) 500 MG tablet Take 1 tablet (500 mg total) by mouth once daily. for 7 days 7 tablet 4/20/2022 4/27/2022 Phi Covington MD    methylPREDNISolone (MEDROL DOSEPACK) 4 mg tablet Pack as directed 1 each 4/20/2022  Phi Covington MD    benzonatate (TESSALON) 100 MG capsule Take 2 capsules (200 mg total) by mouth 3 (three) times daily as needed for Cough. 20 capsule 4/20/2022 4/30/2022 Phi Covington MD        Follow-up Information     Follow up With Specialties Details Why Contact Info    Marc Chinchilla MD Family Medicine Schedule an appointment as soon as possible for a  visit in 2 days  [address] [facility]  [address]  [place] [address]  [phone]      Marc Chinchilla MD Family Medicine Schedule an appointment as soon as possible for a visit in 2 days  [address] [facility]  [place] [address]  [phone]             Phi Covington MD  04/20/22 1699

## 2022-04-25 LAB
BACTERIA BLD CULT: NORMAL
BACTERIA BLD CULT: NORMAL

## 2022-08-08 ENCOUNTER — TELEPHONE (OUTPATIENT)
Dept: OBSTETRICS AND GYNECOLOGY | Facility: CLINIC | Age: 62
End: 2022-08-08
Payer: MEDICARE

## 2022-08-08 NOTE — TELEPHONE ENCOUNTER
Pt was called and she complained of vaginal burning. Pt requesting appt tomorrow. Offered appt with Dr. Dexter tomorrow at 1:45. Pt voiced understanding. Address given.

## 2022-08-08 NOTE — TELEPHONE ENCOUNTER
----- Message from Yessica Chanel sent at 2022  9:10 AM CDT -----  Contact: self  Christina Townsend  MRN: [id number]  : 1960  PCP: Marc Chinchilla  Home Phone      [phone]  Work Phone      Not on file.  Mobile          [phone]  Mobile          Not on file.      MESSAGE: New patient is calling to get a sooner appt due to having problems Burning and soreness         Phone [phone]

## 2022-08-09 ENCOUNTER — OFFICE VISIT (OUTPATIENT)
Dept: OBSTETRICS AND GYNECOLOGY | Facility: CLINIC | Age: 62
End: 2022-08-09
Attending: OBSTETRICS & GYNECOLOGY
Payer: MEDICARE

## 2022-08-09 VITALS
HEART RATE: 77 BPM | BODY MASS INDEX: 44.69 KG/M2 | HEIGHT: 64 IN | SYSTOLIC BLOOD PRESSURE: 128 MMHG | WEIGHT: 261.81 LBS | RESPIRATION RATE: 18 BRPM | DIASTOLIC BLOOD PRESSURE: 78 MMHG

## 2022-08-09 DIAGNOSIS — N89.8 VAGINAL IRRITATION: ICD-10-CM

## 2022-08-09 DIAGNOSIS — Z78.0 POSTMENOPAUSAL STATE: ICD-10-CM

## 2022-08-09 DIAGNOSIS — Z12.31 ENCOUNTER FOR SCREENING MAMMOGRAM FOR MALIGNANT NEOPLASM OF BREAST: ICD-10-CM

## 2022-08-09 DIAGNOSIS — Z01.419 ENCOUNTER FOR GYNECOLOGICAL EXAMINATION WITHOUT ABNORMAL FINDING: Primary | ICD-10-CM

## 2022-08-09 PROCEDURE — 1159F PR MEDICATION LIST DOCUMENTED IN MEDICAL RECORD: ICD-10-PCS | Mod: CPTII,S$GLB,, | Performed by: OBSTETRICS & GYNECOLOGY

## 2022-08-09 PROCEDURE — 1160F RVW MEDS BY RX/DR IN RCRD: CPT | Mod: CPTII,S$GLB,, | Performed by: OBSTETRICS & GYNECOLOGY

## 2022-08-09 PROCEDURE — 87801 DETECT AGNT MULT DNA AMPLI: CPT | Performed by: OBSTETRICS & GYNECOLOGY

## 2022-08-09 PROCEDURE — 3074F PR MOST RECENT SYSTOLIC BLOOD PRESSURE < 130 MM HG: ICD-10-PCS | Mod: CPTII,S$GLB,, | Performed by: OBSTETRICS & GYNECOLOGY

## 2022-08-09 PROCEDURE — 3078F PR MOST RECENT DIASTOLIC BLOOD PRESSURE < 80 MM HG: ICD-10-PCS | Mod: CPTII,S$GLB,, | Performed by: OBSTETRICS & GYNECOLOGY

## 2022-08-09 PROCEDURE — 1160F PR REVIEW ALL MEDS BY PRESCRIBER/CLIN PHARMACIST DOCUMENTED: ICD-10-PCS | Mod: CPTII,S$GLB,, | Performed by: OBSTETRICS & GYNECOLOGY

## 2022-08-09 PROCEDURE — 99999 PR PBB SHADOW E&M-EST. PATIENT-LVL III: CPT | Mod: PBBFAC,,, | Performed by: OBSTETRICS & GYNECOLOGY

## 2022-08-09 PROCEDURE — 99386 PR PREVENTIVE VISIT,NEW,40-64: ICD-10-PCS | Mod: S$GLB,,, | Performed by: OBSTETRICS & GYNECOLOGY

## 2022-08-09 PROCEDURE — 3008F PR BODY MASS INDEX (BMI) DOCUMENTED: ICD-10-PCS | Mod: CPTII,S$GLB,, | Performed by: OBSTETRICS & GYNECOLOGY

## 2022-08-09 PROCEDURE — 3078F DIAST BP <80 MM HG: CPT | Mod: CPTII,S$GLB,, | Performed by: OBSTETRICS & GYNECOLOGY

## 2022-08-09 PROCEDURE — 99386 PREV VISIT NEW AGE 40-64: CPT | Mod: S$GLB,,, | Performed by: OBSTETRICS & GYNECOLOGY

## 2022-08-09 PROCEDURE — 1159F MED LIST DOCD IN RCRD: CPT | Mod: CPTII,S$GLB,, | Performed by: OBSTETRICS & GYNECOLOGY

## 2022-08-09 PROCEDURE — 99999 PR PBB SHADOW E&M-EST. PATIENT-LVL III: ICD-10-PCS | Mod: PBBFAC,,, | Performed by: OBSTETRICS & GYNECOLOGY

## 2022-08-09 PROCEDURE — 3008F BODY MASS INDEX DOCD: CPT | Mod: CPTII,S$GLB,, | Performed by: OBSTETRICS & GYNECOLOGY

## 2022-08-09 PROCEDURE — 3074F SYST BP LT 130 MM HG: CPT | Mod: CPTII,S$GLB,, | Performed by: OBSTETRICS & GYNECOLOGY

## 2022-08-09 PROCEDURE — 87481 CANDIDA DNA AMP PROBE: CPT | Mod: 59 | Performed by: OBSTETRICS & GYNECOLOGY

## 2022-08-09 RX ORDER — SULFAMETHOXAZOLE AND TRIMETHOPRIM 800; 160 MG/1; MG/1
TABLET ORAL
COMMUNITY
Start: 2022-04-29 | End: 2023-08-18

## 2022-08-09 RX ORDER — FUROSEMIDE 20 MG/1
20 TABLET ORAL DAILY PRN
COMMUNITY
Start: 2022-07-06

## 2022-08-09 RX ORDER — NAPROXEN 250 MG/1
250 TABLET ORAL EVERY 8 HOURS PRN
COMMUNITY
Start: 2022-05-30 | End: 2023-08-18 | Stop reason: ALTCHOICE

## 2022-08-09 RX ORDER — OXYBUTYNIN CHLORIDE 5 MG/1
5 TABLET ORAL 2 TIMES DAILY
Qty: 60 TABLET | Refills: 2 | Status: SHIPPED | OUTPATIENT
Start: 2022-08-09 | End: 2023-08-18

## 2022-08-09 NOTE — PROGRESS NOTES
Subjective:       Patient ID: Christina Townsend is a 61 y.o. female.    Chief Complaint:  vaginal discomfort (Pt states she have been burning after sex and bumps)      History of Present Illness  Patient presents for annual exam.  Patient admits it is time for a mammogram.  She has noticed some vaginal itching mostly on the outside.  She last noticed it about 2 days ago.  She states she has had off and on.  She is not treated with any medication.  She denies noticeable discharge or noticeable odor.  Patient does admit to some urinary incontinence with urgency and loss of urine for she can reach the bathroom.    Menstrual History:  OB History        3    Para   3    Term   3            AB        Living   3       SAB        IAB        Ectopic        Multiple        Live Births                    Menarche age:  No LMP recorded. Patient has had a hysterectomy.         Review of Systems  Review of Systems   Constitutional: Positive for chills. Negative for activity change, appetite change, diaphoresis, fatigue, fever and unexpected weight change.   HENT: Positive for ear pain and tinnitus. Negative for congestion, dental problem, drooling, ear discharge, facial swelling, hearing loss, mouth sores, nosebleeds, postnasal drip, rhinorrhea, sinus pressure, sneezing, sore throat, trouble swallowing and voice change.    Eyes: Positive for photophobia and redness. Negative for pain, discharge, itching and visual disturbance.   Respiratory: Positive for shortness of breath. Negative for apnea, cough, choking, chest tightness, wheezing and stridor.    Cardiovascular: Negative for chest pain, palpitations and leg swelling.   Gastrointestinal: Negative for abdominal distention, abdominal pain, anal bleeding, blood in stool, constipation, diarrhea, nausea, rectal pain and vomiting.   Endocrine: Negative for cold intolerance, heat intolerance, polydipsia, polyphagia and polyuria.   Genitourinary: Negative for  decreased urine volume, difficulty urinating, dyspareunia, dysuria, enuresis, flank pain, frequency, genital sores, hematuria, menstrual problem, pelvic pain, urgency, vaginal bleeding, vaginal discharge and vaginal pain.   Musculoskeletal: Positive for arthralgias, back pain and gait problem. Negative for joint swelling, myalgias, neck pain and neck stiffness.   Skin: Negative for color change, pallor, rash and wound.   Allergic/Immunologic: Negative for environmental allergies, food allergies and immunocompromised state.   Neurological: Positive for light-headedness. Negative for dizziness, tremors, seizures, syncope, facial asymmetry, speech difficulty, weakness, numbness and headaches.   Hematological: Negative for adenopathy. Does not bruise/bleed easily.   Psychiatric/Behavioral: Negative for agitation, behavioral problems, confusion, decreased concentration, dysphoric mood, hallucinations, self-injury, sleep disturbance and suicidal ideas. The patient is not nervous/anxious and is not hyperactive.            Objective:      Physical Exam  Vitals and nursing note reviewed. Exam conducted with a chaperone present.   Constitutional:       Appearance: She is well-developed.   Neck:      Thyroid: No thyromegaly.   Cardiovascular:      Rate and Rhythm: Normal rate and regular rhythm.   Pulmonary:      Effort: Pulmonary effort is normal.      Breath sounds: Normal breath sounds.   Chest:   Breasts: Breasts are symmetrical.      Right: No inverted nipple, mass, nipple discharge, skin change or tenderness.      Left: No inverted nipple, mass, nipple discharge, skin change or tenderness.       Abdominal:      General: Bowel sounds are normal.      Palpations: Abdomen is soft. There is no mass.      Tenderness: There is no abdominal tenderness.      Hernia: There is no hernia in the left inguinal area or right inguinal area.   Genitourinary:     General: Normal vulva.      Labia:         Right: No rash, tenderness,  lesion or injury.         Left: No rash, tenderness, lesion or injury.       Urethra: No prolapse, urethral pain, urethral swelling or urethral lesion.      Vagina: Normal. No signs of injury and foreign body. No vaginal discharge, erythema, tenderness, bleeding, lesions or prolapsed vaginal walls.      Cervix: Cervical motion tenderness ( surgically absent) present.      Uterus: Deviated ( surgically absent).       Adnexa:         Right: No mass, tenderness or fullness.          Left: No mass, tenderness or fullness.        Rectum: No external hemorrhoid.   Musculoskeletal:         General: Normal range of motion.   Lymphadenopathy:      Lower Body: No right inguinal adenopathy.   Skin:     General: Skin is dry.   Neurological:      Mental Status: She is alert and oriented to person, place, and time.      Deep Tendon Reflexes: Reflexes are normal and symmetric.   Psychiatric:         Behavior: Behavior normal.         Thought Content: Thought content normal.         Judgment: Judgment normal.             Assessment:        1. Encounter for gynecological examination without abnormal finding    2. Postmenopausal state    3. Vaginal irritation    4. Encounter for screening mammogram for malignant neoplasm of breast                Plan:         Christina was seen today for vaginal discomfort.    Diagnoses and all orders for this visit:    Encounter for gynecological examination without abnormal finding    Postmenopausal state    Vaginal irritation  -     Vaginosis Screen by DNA Probe    Encounter for screening mammogram for malignant neoplasm of breast  -     Mammo Digital Screening Bilat w/ Syed; Future    Other orders  -     oxybutynin (DITROPAN) 5 MG Tab; Take 1 tablet (5 mg total) by mouth 2 (two) times daily.

## 2022-08-13 LAB
BACTERIAL VAGINOSIS DNA: NEGATIVE
CANDIDA GLABRATA DNA: NEGATIVE
CANDIDA KRUSEI DNA: NEGATIVE
CANDIDA RRNA VAG QL PROBE: NEGATIVE
T VAGINALIS RRNA GENITAL QL PROBE: NEGATIVE

## 2022-09-13 ENCOUNTER — HOSPITAL ENCOUNTER (EMERGENCY)
Facility: HOSPITAL | Age: 62
Discharge: HOME | End: 2022-09-13
Attending: SURGERY
Payer: MEDICARE

## 2022-09-13 VITALS
DIASTOLIC BLOOD PRESSURE: 68 MMHG | TEMPERATURE: 97 F | WEIGHT: 262.38 LBS | SYSTOLIC BLOOD PRESSURE: 158 MMHG | OXYGEN SATURATION: 100 % | BODY MASS INDEX: 45.03 KG/M2 | RESPIRATION RATE: 20 BRPM | HEART RATE: 57 BPM

## 2022-09-13 DIAGNOSIS — R07.89 CHEST WALL PAIN: Primary | ICD-10-CM

## 2022-09-13 DIAGNOSIS — K21.9 GASTROESOPHAGEAL REFLUX DISEASE WITHOUT ESOPHAGITIS: ICD-10-CM

## 2022-09-13 LAB
ALBUMIN SERPL BCP-MCNC: 3.5 G/DL (ref 3.5–5.2)
ALP SERPL-CCNC: 73 U/L (ref 55–135)
ALT SERPL W/O P-5'-P-CCNC: 12 U/L (ref 10–44)
ANION GAP SERPL CALC-SCNC: 7 MMOL/L (ref 8–16)
AST SERPL-CCNC: 15 U/L (ref 10–40)
BASOPHILS # BLD AUTO: 0.02 K/UL (ref 0–0.2)
BASOPHILS NFR BLD: 0.4 % (ref 0–1.9)
BILIRUB SERPL-MCNC: 0.3 MG/DL (ref 0.1–1)
BNP SERPL-MCNC: 22 PG/ML (ref 0–99)
BUN SERPL-MCNC: 12 MG/DL (ref 8–23)
CALCIUM SERPL-MCNC: 9.9 MG/DL (ref 8.7–10.5)
CHLORIDE SERPL-SCNC: 106 MMOL/L (ref 95–110)
CK SERPL-CCNC: 160 U/L (ref 20–180)
CO2 SERPL-SCNC: 30 MMOL/L (ref 23–29)
CREAT SERPL-MCNC: 0.9 MG/DL (ref 0.5–1.4)
D DIMER PPP IA.FEU-MCNC: 0.53 MG/L FEU
DIFFERENTIAL METHOD: ABNORMAL
EOSINOPHIL # BLD AUTO: 0.2 K/UL (ref 0–0.5)
EOSINOPHIL NFR BLD: 3.7 % (ref 0–8)
ERYTHROCYTE [DISTWIDTH] IN BLOOD BY AUTOMATED COUNT: 16 % (ref 11.5–14.5)
EST. GFR  (NO RACE VARIABLE): >60 ML/MIN/1.73 M^2
GLUCOSE SERPL-MCNC: 91 MG/DL (ref 70–110)
HCT VFR BLD AUTO: 42.5 % (ref 37–48.5)
HGB BLD-MCNC: 13.5 G/DL (ref 12–16)
IMM GRANULOCYTES # BLD AUTO: 0.02 K/UL (ref 0–0.04)
IMM GRANULOCYTES NFR BLD AUTO: 0.4 % (ref 0–0.5)
LYMPHOCYTES # BLD AUTO: 2.5 K/UL (ref 1–4.8)
LYMPHOCYTES NFR BLD: 44.6 % (ref 18–48)
MCH RBC QN AUTO: 27 PG (ref 27–31)
MCHC RBC AUTO-ENTMCNC: 31.8 G/DL (ref 32–36)
MCV RBC AUTO: 85 FL (ref 82–98)
MONOCYTES # BLD AUTO: 0.7 K/UL (ref 0.3–1)
MONOCYTES NFR BLD: 12.5 % (ref 4–15)
NEUTROPHILS # BLD AUTO: 2.2 K/UL (ref 1.8–7.7)
NEUTROPHILS NFR BLD: 38.4 % (ref 38–73)
NRBC BLD-RTO: 0 /100 WBC
PLATELET # BLD AUTO: 229 K/UL (ref 150–450)
PMV BLD AUTO: 10.4 FL (ref 9.2–12.9)
POTASSIUM SERPL-SCNC: 4.5 MMOL/L (ref 3.5–5.1)
PROT SERPL-MCNC: 7.1 G/DL (ref 6–8.4)
RBC # BLD AUTO: 5 M/UL (ref 4–5.4)
SODIUM SERPL-SCNC: 143 MMOL/L (ref 136–145)
TROPONIN I SERPL DL<=0.01 NG/ML-MCNC: <0.006 NG/ML (ref 0–0.03)
TROPONIN I SERPL DL<=0.01 NG/ML-MCNC: <0.006 NG/ML (ref 0–0.03)
WBC # BLD AUTO: 5.7 K/UL (ref 3.9–12.7)

## 2022-09-13 PROCEDURE — 82550 ASSAY OF CK (CPK): CPT | Performed by: SURGERY

## 2022-09-13 PROCEDURE — 85379 FIBRIN DEGRADATION QUANT: CPT | Performed by: SURGERY

## 2022-09-13 PROCEDURE — 25000003 PHARM REV CODE 250: Performed by: SURGERY

## 2022-09-13 PROCEDURE — 83880 ASSAY OF NATRIURETIC PEPTIDE: CPT | Performed by: SURGERY

## 2022-09-13 PROCEDURE — 84484 ASSAY OF TROPONIN QUANT: CPT | Mod: 91 | Performed by: SURGERY

## 2022-09-13 PROCEDURE — 93010 EKG 12-LEAD: ICD-10-PCS | Mod: ,,, | Performed by: INTERNAL MEDICINE

## 2022-09-13 PROCEDURE — 80053 COMPREHEN METABOLIC PANEL: CPT | Performed by: SURGERY

## 2022-09-13 PROCEDURE — 99285 EMERGENCY DEPT VISIT HI MDM: CPT | Mod: 25

## 2022-09-13 PROCEDURE — 36415 COLL VENOUS BLD VENIPUNCTURE: CPT | Performed by: SURGERY

## 2022-09-13 PROCEDURE — 93010 ELECTROCARDIOGRAM REPORT: CPT | Mod: ,,, | Performed by: INTERNAL MEDICINE

## 2022-09-13 PROCEDURE — 93005 ELECTROCARDIOGRAM TRACING: CPT

## 2022-09-13 PROCEDURE — 85025 COMPLETE CBC W/AUTO DIFF WBC: CPT | Performed by: SURGERY

## 2022-09-13 PROCEDURE — 96374 THER/PROPH/DIAG INJ IV PUSH: CPT

## 2022-09-13 PROCEDURE — 63600175 PHARM REV CODE 636 W HCPCS: Performed by: SURGERY

## 2022-09-13 PROCEDURE — 96375 TX/PRO/DX INJ NEW DRUG ADDON: CPT

## 2022-09-13 RX ORDER — MEPERIDINE HYDROCHLORIDE 25 MG/ML
25 INJECTION INTRAMUSCULAR; INTRAVENOUS; SUBCUTANEOUS
Status: COMPLETED | OUTPATIENT
Start: 2022-09-13 | End: 2022-09-13

## 2022-09-13 RX ORDER — CYCLOBENZAPRINE HCL 10 MG
10 TABLET ORAL 3 TIMES DAILY PRN
Qty: 10 TABLET | Refills: 0 | Status: SHIPPED | OUTPATIENT
Start: 2022-09-13 | End: 2022-09-18

## 2022-09-13 RX ORDER — IBUPROFEN 800 MG/1
800 TABLET ORAL EVERY 6 HOURS PRN
Qty: 20 TABLET | Refills: 0 | Status: SHIPPED | OUTPATIENT
Start: 2022-09-13 | End: 2023-08-18 | Stop reason: ALTCHOICE

## 2022-09-13 RX ORDER — ASPIRIN 325 MG
325 TABLET ORAL
Status: COMPLETED | OUTPATIENT
Start: 2022-09-13 | End: 2022-09-13

## 2022-09-13 RX ORDER — ONDANSETRON 2 MG/ML
4 INJECTION INTRAMUSCULAR; INTRAVENOUS
Status: COMPLETED | OUTPATIENT
Start: 2022-09-13 | End: 2022-09-13

## 2022-09-13 RX ORDER — PANTOPRAZOLE SODIUM 40 MG/1
40 TABLET, DELAYED RELEASE ORAL DAILY
Qty: 30 TABLET | Refills: 0 | Status: SHIPPED | OUTPATIENT
Start: 2022-09-13 | End: 2022-10-13

## 2022-09-13 RX ADMIN — ASPIRIN 325 MG ORAL TABLET 325 MG: 325 PILL ORAL at 01:09

## 2022-09-13 RX ADMIN — MEPERIDINE HYDROCHLORIDE 25 MG: 25 INJECTION INTRAMUSCULAR; INTRAVENOUS; SUBCUTANEOUS at 06:09

## 2022-09-13 RX ADMIN — ONDANSETRON HYDROCHLORIDE 4 MG: 2 SOLUTION INTRAMUSCULAR; INTRAVENOUS at 06:09

## 2022-09-13 NOTE — SUBJECTIVE & OBJECTIVE
"Past Medical History:   Diagnosis Date    Back pain     Bronchitis     Contact dermatitis     Corns and callosities     Gout     Lymphedema of both lower extremities     Morbid obesity     RA (rheumatoid arthritis)     Tinea pedis of right foot        Past Surgical History:   Procedure Laterality Date    CARPAL TUNNEL RELEASE  2009    left    COLONOSCOPY N/A 10/20/2020    Procedure: COLONOSCOPY;  Surgeon: Luis A Luu MD;  Location: [facility];  Service: General;  Laterality: N/A;    HYSTERECTOMY      JOINT REPLACEMENT      partial hysterctomy  2010    SHOULDER SURGERY      TOTAL KNEE ARTHROPLASTY  11    left    TUBAL LIGATION         Review of patient's allergies indicates:   Allergen Reactions    Lortab [hydrocodone-acetaminophen] Itching     STATES "CAN'T TAKE LORTAB 5 MG,BUT CAN TAKE LORTAB 10 MG       No current facility-administered medications on file prior to encounter.     Current Outpatient Medications on File Prior to Encounter   Medication Sig    FLUoxetine 40 MG capsule Take 1 capsule (40 mg total) by mouth once daily.    furosemide (LASIX) 20 MG tablet Take 20 mg by mouth daily as needed.    gabapentin (NEURONTIN) 300 MG capsule Take 1 capsule (300 mg total) by mouth 3 (three) times daily.    methylPREDNISolone (MEDROL DOSEPACK) 4 mg tablet Pack as directed    naproxen (NAPROSYN) 250 MG tablet Take 250 mg by mouth every 8 (eight) hours as needed.    oxybutynin (DITROPAN) 5 MG Tab Take 1 tablet (5 mg total) by mouth 2 (two) times daily.    sulfamethoxazole-trimethoprim 800-160mg (BACTRIM DS) 800-160 mg Tab SMARTSI Tablet(s) By Mouth Every 12 Hours    traZODone (DESYREL) 50 MG tablet Take 1 tablet (50 mg total) by mouth every evening.     Family History       Problem Relation (Age of Onset)    Breast cancer Mother    Cancer Mother    Colon cancer Father    Hypertension Mother, Father, Sister, Brother    Stroke Father          Tobacco Use    Smoking status: Every Day     Packs/day: 1.00     Years: " 30.00     Pack years: 30.00     Types: Cigarettes    Smokeless tobacco: Never   Substance and Sexual Activity    Alcohol use: Yes     Comment: occasional light drinker    Drug use: No    Sexual activity: Yes     Partners: Male     Review of Systems   Constitutional: Negative.   HENT: Negative.     Eyes: Negative.    Cardiovascular:  Positive for chest pain.   Respiratory: Negative.     Endocrine: Negative.    Hematologic/Lymphatic: Negative.    Skin: Negative.    Musculoskeletal: Negative.    Gastrointestinal: Negative.    Genitourinary: Negative.    Neurological: Negative.    Psychiatric/Behavioral: Negative.     Allergic/Immunologic: Negative.    Objective:     Vital Signs (Most Recent):  Temp: 97 °F (36.1 °C) (09/13/22 1332)  Pulse: (!) 56 (09/13/22 1500)  Resp: (!) 22 (09/13/22 1500)  BP: (!) 152/71 (09/13/22 1500)  SpO2: 99 % (09/13/22 1500)   Vital Signs (24h Range):  Temp:  [97 °F (36.1 °C)] 97 °F (36.1 °C)  Pulse:  [55-60] 56  Resp:  [18-25] 22  SpO2:  [96 %-100 %] 99 %  BP: (145-183)/(70-73) 152/71     Weight: 119 kg (262 lb 5.6 oz)  Body mass index is 45.03 kg/m².    SpO2: 99 %  O2 Device (Oxygen Therapy): room air    No intake or output data in the 24 hours ending 09/13/22 1527    Lines/Drains/Airways       Peripheral Intravenous Line  Duration                  Peripheral IV - Single Lumen 09/13/22 1344 20 G Left Antecubital <1 day                    Physical Exam  Constitutional:       General: She is not in acute distress.     Appearance: She is obese. She is not ill-appearing, toxic-appearing or diaphoretic.   Cardiovascular:      Rate and Rhythm: Regular rhythm. Bradycardia present.      Pulses: Normal pulses.      Heart sounds: Normal heart sounds. No murmur heard.    No friction rub. No gallop.   Pulmonary:      Effort: Pulmonary effort is normal. No respiratory distress.      Breath sounds: Normal breath sounds. No stridor. No wheezing, rhonchi or rales.   Chest:      Chest wall: No tenderness.    Abdominal:      General: Abdomen is flat. Bowel sounds are normal.      Palpations: Abdomen is soft.   Musculoskeletal:         General: Normal range of motion.      Cervical back: Normal range of motion and neck supple.   Skin:     General: Skin is warm and dry.   Neurological:      General: No focal deficit present.      Mental Status: She is alert and oriented to person, place, and time. Mental status is at baseline.   Psychiatric:         Mood and Affect: Mood normal.         Behavior: Behavior normal.         Thought Content: Thought content normal.         Judgment: Judgment normal.       Significant Labs:   Recent Lab Results         09/13/22  1443   09/13/22  1354        Albumin   3.5       Alkaline Phosphatase   73       ALT   12       Anion Gap   7       AST   15       Baso # 0.02         Basophil % 0.4         BILIRUBIN TOTAL   0.3  Comment: For infants and newborns, interpretation of results should be based  on gestational age, weight and in agreement with clinical  observations.    Premature Infant recommended reference ranges:  Up to 24 hours.............<8.0 mg/dL  Up to 48 hours............<12.0 mg/dL  3-5 days..................<15.0 mg/dL  6-29 days.................<15.0 mg/dL         BNP   22  Comment: Values of less than 100 pg/ml are consistent with non-CHF populations.       BUN   12       Calcium   9.9       Chloride   106       CO2   30       CPK   160       Creatinine   0.9       D-Dimer   0.53  Comment: The quantitative D-dimer assay should be used as an aid in   the diagnosis of deep vein thrombosis and pulmonary embolism  in patients with the appropriate presentation and clinical  history. The upper limit of the reference interval and the clinical   cut off   point are identical. Causes of a positive (>0.50 mg/L FEU) D-Dimer   test  include, but are not limited to: DVT, PE, DIC, thrombolytic   therapy, anticoagulant therapy, recent surgery, trauma, or   pregnancy, disseminated  malignancy, aortic aneurysm, cirrhosis,  and severe infection. False negative results may occur in   patients with distal DVT.  BENJAMIN^612^^7^  LOT^610^DDiSup^[id number]\DDiBuf^[id number]\DDiReag^[id number]         Differential Method Automated         eGFR   >60       Eos # 0.2         Eosinophil % 3.7         Glucose   91       Gran # (ANC) 2.2         Gran % 38.4         Hematocrit 42.5         Hemoglobin 13.5         Immature Grans (Abs) 0.02  Comment: Mild elevation in immature granulocytes is non specific and   can be seen in a variety of conditions including stress response,   acute inflammation, trauma and pregnancy. Correlation with other   laboratory and clinical findings is essential.           Immature Granulocytes 0.4         Lymph # 2.5         Lymph % 44.6         MCH 27.0         MCHC 31.8         MCV 85         Mono # 0.7         Mono % 12.5         MPV 10.4         nRBC 0         Platelets 229         Potassium   4.5       PROTEIN TOTAL   7.1       RBC 5.00         RDW 16.0         Sodium   143       Troponin I   <0.006  Comment: The reference interval for Troponin I represents the 99th percentile   cutoff   for our facility and is consistent with 3rd generation assay   performance.         WBC 5.70                 Significant Imaging: CT scan: CT ABDOMEN PELVIS WITH CONTRAST: No results found for this visit on 09/13/22. and CT ABDOMEN PELVIS WITHOUT CONTRAST: No results found for this visit on 09/13/22., Echocardiogram: 2D echo with color flow doppler: No results found for this or any previous visit. and Transthoracic echo (TTE) complete (Cupid Only):   Results for orders placed or performed during the hospital encounter of 11/19/20   Echo Color Flow Doppler? Yes   Result Value Ref Range    BSA 1.99 m2    TDI SEPTAL 0.09 m/s    LV LATERAL E/E' RATIO 8.20 m/s    LV SEPTAL E/E' RATIO 9.11 m/s    LA WIDTH 4.03 cm    TDI LATERAL 0.10 m/s    PV PEAK VELOCITY 1.02 cm/s    LVIDd 4.57 3.5 - 6.0 cm    IVS 0.74 0.6 - 1.1  cm    Posterior Wall 0.85 0.6 - 1.1 cm    LVIDs 2.71 2.1 - 4.0 cm    FS 41 28 - 44 %    IVC proximal 2.0 cm    EF + QEF 68 %    LA volume 58.37 cm3    Sinus 2.87 cm    STJ 2.37 cm    Ascending aorta 2.92 cm    LV mass 115.67 g    LA size 3.71 cm    RVDD 3.07 cm    TAPSE 2.36 cm    Right ventricular length in diastole (apical 4-chamber view) 6.50 cm    RV S' 13 cm/s    Left Ventricle Relative Wall Thickness 0.37 cm    AV mean gradient 8 mmHg    AV valve area 2.36 cm2    AV Velocity Ratio 0.74     AV index (prosthetic) 0.74     E/A ratio 1.32     Mean e' 0.10 m/s    E wave deceleration time 170.04 msec    Pulm vein S/D ratio 1.66     LVOT diameter 2.02 cm    LVOT area 3.2 cm2    LVOT peak brayan 1.45 m/s    LVOT peak VTI 31.34 cm    Ao peak brayan 1.95 m/s    Ao VTI 42.57 cm    RVOT prox diameter 1.9 cm    RVOT area 2.83 cm2    LVOT stroke volume 100.39 cm3    AV peak gradient 15 mmHg    E/E' ratio 8.63 m/s    MV Peak E Brayan 0.82 m/s    TR Max Brayan 2.55 m/s    MV Peak A Brayan 0.62 m/s    PV Peak S Brayan 0.58 m/s    PV Peak D Brayan 0.35 m/s    LV Systolic Volume 27.29 mL    LV Systolic Volume Index 14.1 mL/m2    LV Diastolic Volume 95.97 mL    LV Diastolic Volume Index 49.70 mL/m2    LA Volume Index 30.2 mL/m2    LV Mass Index 60 g/m2    RA Major Axis 4.53 cm    Left Atrium Minor Axis 4.50 cm    Left Atrium Major Axis 4.69 cm    Triscuspid Valve Regurgitation Peak Gradient 26 mmHg    LA Volume Index (Mod) 31.1 mL/m2    LA volume (mod) 60.00 cm3    Right Atrial Pressure (from IVC) 3 mmHg    TV rest pulmonary artery pressure 29 mmHg    Narrative    1. Left ventricle: Normal in size with normal systolic function. LV   ejection fraction is 55-60%. There are no significant regional wall motion   abnormalities. Normal LV geometry.  2. Left ventricular diastolic function: Normal.  3. Right ventricle: Normal in size with normal systolic function.   4. Estimated PASP: <40 mmHg normal Estimated RAP: 3 mmHg based on IVC   assessment.  5. Left  atrium: Normal in size.  Right atrium: Normal in size  6. Valves: Mild aortic regurgitation  7. Aorta: Normal caliber when indexed for BSA  8. Pericardium: Normal thickness No effusion present.  9. Technical quality is adequate.  10. No prior studies available for comparison     , and X-Ray: CXR: X-Ray Chest 1 View (CXR):   Results for orders placed or performed during the hospital encounter of 09/13/22   X-Ray Chest 1 View    Narrative    EXAMINATION:  XR CHEST 1 VIEW    CLINICAL HISTORY:  CP;    TECHNIQUE:  Single frontal view of the chest was performed.    COMPARISON:  04/20/2022    FINDINGS:  The lungs are clear with normal appearance of pulmonary vasculature. No pleural effusion. No evident pneumothorax.    The cardiac silhouette is normal in size. The hilar and mediastinal contours are unremarkable.    Bones are intact.      Impression    No acute abnormality.      Electronically signed by: Julianna French MD  Date:    09/13/2022  Time:    14:00    and X-Ray Chest PA and Lateral (CXR): No results found for this visit on 09/13/22.

## 2022-09-13 NOTE — HPI
61 year old female with history of rheumatoid arthritis, morbid obesity, presented to ED with c/o CP. EKG without any acute ischemic changes or ST elevation. Troponin negative. D-Dimer elevated 0.53. [facility] consulted for evaluation of CP.

## 2022-09-13 NOTE — CONSULTS
"[facility] - Emergency Dept  Cardiology  Consult Note    Patient Name: Christina Townsend  MRN: [id number]  Admission Date: 9/13/2022  Hospital Length of Stay: 0 days  Code Status: Prior   Attending Provider: Phi Covington MD   Consulting Provider: Abe Najera NP  Primary Care Physician: Carla Pemberton MD  Principal Problem:<principal problem not specified>    Patient information was obtained from patient, past medical records and ER records.     Consults  Subjective:     Chief Complaint:  CP     HPI:   61 year old female with history of rheumatoid arthritis, morbid obesity, presented to ED with c/o CP. EKG without any acute ischemic changes or ST elevation. Troponin negative. D-Dimer elevated 0.53. [facility] consulted for evaluation of CP.       Past Medical History:   Diagnosis Date    Back pain     Bronchitis     Contact dermatitis     Corns and callosities     Gout     Lymphedema of both lower extremities     Morbid obesity     RA (rheumatoid arthritis)     Tinea pedis of right foot        Past Surgical History:   Procedure Laterality Date    CARPAL TUNNEL RELEASE  2009    left    COLONOSCOPY N/A 10/20/2020    Procedure: COLONOSCOPY;  Surgeon: Luis A Luu MD;  Location: [facility];  Service: General;  Laterality: N/A;    HYSTERECTOMY      JOINT REPLACEMENT      partial hysterctomy  2010    SHOULDER SURGERY      TOTAL KNEE ARTHROPLASTY  4/4/11    left    TUBAL LIGATION         Review of patient's allergies indicates:   Allergen Reactions    Lortab [hydrocodone-acetaminophen] Itching     STATES "CAN'T TAKE LORTAB 5 MG,BUT CAN TAKE LORTAB 10 MG       No current facility-administered medications on file prior to encounter.     Current Outpatient Medications on File Prior to Encounter   Medication Sig    FLUoxetine 40 MG capsule Take 1 capsule (40 mg total) by mouth once daily.    furosemide (LASIX) 20 MG tablet Take 20 mg by mouth daily as needed.    gabapentin (NEURONTIN) 300 MG capsule Take 1 capsule (300 mg " total) by mouth 3 (three) times daily.    methylPREDNISolone (MEDROL DOSEPACK) 4 mg tablet Pack as directed    naproxen (NAPROSYN) 250 MG tablet Take 250 mg by mouth every 8 (eight) hours as needed.    oxybutynin (DITROPAN) 5 MG Tab Take 1 tablet (5 mg total) by mouth 2 (two) times daily.    sulfamethoxazole-trimethoprim 800-160mg (BACTRIM DS) 800-160 mg Tab SMARTSI Tablet(s) By Mouth Every 12 Hours    traZODone (DESYREL) 50 MG tablet Take 1 tablet (50 mg total) by mouth every evening.     Family History       Problem Relation (Age of Onset)    Breast cancer Mother    Cancer Mother    Colon cancer Father    Hypertension Mother, Father, Sister, Brother    Stroke Father          Tobacco Use    Smoking status: Every Day     Packs/day: 1.00     Years: 30.00     Pack years: 30.00     Types: Cigarettes    Smokeless tobacco: Never   Substance and Sexual Activity    Alcohol use: Yes     Comment: occasional light drinker    Drug use: No    Sexual activity: Yes     Partners: Male     Review of Systems   Constitutional: Negative.   HENT: Negative.     Eyes: Negative.    Cardiovascular:  Positive for chest pain.   Respiratory: Negative.     Endocrine: Negative.    Hematologic/Lymphatic: Negative.    Skin: Negative.    Musculoskeletal: Negative.    Gastrointestinal: Negative.    Genitourinary: Negative.    Neurological: Negative.    Psychiatric/Behavioral: Negative.     Allergic/Immunologic: Negative.    Objective:     Vital Signs (Most Recent):  Temp: 97 °F (36.1 °C) (22 1332)  Pulse: (!) 56 (22 1500)  Resp: (!) 22 (22 1500)  BP: (!) 152/71 (22 1500)  SpO2: 99 % (22 1500)   Vital Signs (24h Range):  Temp:  [97 °F (36.1 °C)] 97 °F (36.1 °C)  Pulse:  [55-60] 56  Resp:  [18-25] 22  SpO2:  [96 %-100 %] 99 %  BP: (145-183)/(70-73) 152/71     Weight: 119 kg (262 lb 5.6 oz)  Body mass index is 45.03 kg/m².    SpO2: 99 %  O2 Device (Oxygen Therapy): room air    No intake or output data in the 24 hours  ending 09/13/22 1527    Lines/Drains/Airways       Peripheral Intravenous Line  Duration                  Peripheral IV - Single Lumen 09/13/22 1344 20 G Left Antecubital <1 day                    Physical Exam  Constitutional:       General: She is not in acute distress.     Appearance: She is obese. She is not ill-appearing, toxic-appearing or diaphoretic.   Cardiovascular:      Rate and Rhythm: Regular rhythm. Bradycardia present.      Pulses: Normal pulses.      Heart sounds: Normal heart sounds. No murmur heard.    No friction rub. No gallop.   Pulmonary:      Effort: Pulmonary effort is normal. No respiratory distress.      Breath sounds: Normal breath sounds. No stridor. No wheezing, rhonchi or rales.   Chest:      Chest wall: No tenderness.   Abdominal:      General: Abdomen is flat. Bowel sounds are normal.      Palpations: Abdomen is soft.   Musculoskeletal:         General: Normal range of motion.      Cervical back: Normal range of motion and neck supple.   Skin:     General: Skin is warm and dry.   Neurological:      General: No focal deficit present.      Mental Status: She is alert and oriented to person, place, and time. Mental status is at baseline.   Psychiatric:         Mood and Affect: Mood normal.         Behavior: Behavior normal.         Thought Content: Thought content normal.         Judgment: Judgment normal.       Significant Labs:   Recent Lab Results         09/13/22  1443   09/13/22  1354        Albumin   3.5       Alkaline Phosphatase   73       ALT   12       Anion Gap   7       AST   15       Baso # 0.02         Basophil % 0.4         BILIRUBIN TOTAL   0.3  Comment: For infants and newborns, interpretation of results should be based  on gestational age, weight and in agreement with clinical  observations.    Premature Infant recommended reference ranges:  Up to 24 hours.............<8.0 mg/dL  Up to 48 hours............<12.0 mg/dL  3-5 days..................<15.0 mg/dL  6-29  days.................<15.0 mg/dL         BNP   22  Comment: Values of less than 100 pg/ml are consistent with non-CHF populations.       BUN   12       Calcium   9.9       Chloride   106       CO2   30       CPK   160       Creatinine   0.9       D-Dimer   0.53  Comment: The quantitative D-dimer assay should be used as an aid in   the diagnosis of deep vein thrombosis and pulmonary embolism  in patients with the appropriate presentation and clinical  history. The upper limit of the reference interval and the clinical   cut off   point are identical. Causes of a positive (>0.50 mg/L FEU) D-Dimer   test  include, but are not limited to: DVT, PE, DIC, thrombolytic   therapy, anticoagulant therapy, recent surgery, trauma, or   pregnancy, disseminated malignancy, aortic aneurysm, cirrhosis,  and severe infection. False negative results may occur in   patients with distal DVT.  BENJAMIN^612^^7^  LOT^610^DDiSup^[id number]\DDiBuf^[id number]\DDiReag^[id number]         Differential Method Automated         eGFR   >60       Eos # 0.2         Eosinophil % 3.7         Glucose   91       Gran # (ANC) 2.2         Gran % 38.4         Hematocrit 42.5         Hemoglobin 13.5         Immature Grans (Abs) 0.02  Comment: Mild elevation in immature granulocytes is non specific and   can be seen in a variety of conditions including stress response,   acute inflammation, trauma and pregnancy. Correlation with other   laboratory and clinical findings is essential.           Immature Granulocytes 0.4         Lymph # 2.5         Lymph % 44.6         MCH 27.0         MCHC 31.8         MCV 85         Mono # 0.7         Mono % 12.5         MPV 10.4         nRBC 0         Platelets 229         Potassium   4.5       PROTEIN TOTAL   7.1       RBC 5.00         RDW 16.0         Sodium   143       Troponin I   <0.006  Comment: The reference interval for Troponin I represents the 99th percentile   cutoff   for our facility and is consistent with 3rd generation assay    performance.         WBC 5.70                 Significant Imaging: CT scan: CT ABDOMEN PELVIS WITH CONTRAST: No results found for this visit on 09/13/22. and CT ABDOMEN PELVIS WITHOUT CONTRAST: No results found for this visit on 09/13/22., Echocardiogram: 2D echo with color flow doppler: No results found for this or any previous visit. and Transthoracic echo (TTE) complete (Cupid Only):   Results for orders placed or performed during the hospital encounter of 11/19/20   Echo Color Flow Doppler? Yes   Result Value Ref Range    BSA 1.99 m2    TDI SEPTAL 0.09 m/s    LV LATERAL E/E' RATIO 8.20 m/s    LV SEPTAL E/E' RATIO 9.11 m/s    LA WIDTH 4.03 cm    TDI LATERAL 0.10 m/s    PV PEAK VELOCITY 1.02 cm/s    LVIDd 4.57 3.5 - 6.0 cm    IVS 0.74 0.6 - 1.1 cm    Posterior Wall 0.85 0.6 - 1.1 cm    LVIDs 2.71 2.1 - 4.0 cm    FS 41 28 - 44 %    IVC proximal 2.0 cm    EF + QEF 68 %    LA volume 58.37 cm3    Sinus 2.87 cm    STJ 2.37 cm    Ascending aorta 2.92 cm    LV mass 115.67 g    LA size 3.71 cm    RVDD 3.07 cm    TAPSE 2.36 cm    Right ventricular length in diastole (apical 4-chamber view) 6.50 cm    RV S' 13 cm/s    Left Ventricle Relative Wall Thickness 0.37 cm    AV mean gradient 8 mmHg    AV valve area 2.36 cm2    AV Velocity Ratio 0.74     AV index (prosthetic) 0.74     E/A ratio 1.32     Mean e' 0.10 m/s    E wave deceleration time 170.04 msec    Pulm vein S/D ratio 1.66     LVOT diameter 2.02 cm    LVOT area 3.2 cm2    LVOT peak brayan 1.45 m/s    LVOT peak VTI 31.34 cm    Ao peak brayan 1.95 m/s    Ao VTI 42.57 cm    RVOT prox diameter 1.9 cm    RVOT area 2.83 cm2    LVOT stroke volume 100.39 cm3    AV peak gradient 15 mmHg    E/E' ratio 8.63 m/s    MV Peak E Brayan 0.82 m/s    TR Max Brayan 2.55 m/s    MV Peak A Brayan 0.62 m/s    PV Peak S Brayan 0.58 m/s    PV Peak D Brayan 0.35 m/s    LV Systolic Volume 27.29 mL    LV Systolic Volume Index 14.1 mL/m2    LV Diastolic Volume 95.97 mL    LV Diastolic Volume Index 49.70 mL/m2    LA  Volume Index 30.2 mL/m2    LV Mass Index 60 g/m2    RA Major Axis 4.53 cm    Left Atrium Minor Axis 4.50 cm    Left Atrium Major Axis 4.69 cm    Triscuspid Valve Regurgitation Peak Gradient 26 mmHg    LA Volume Index (Mod) 31.1 mL/m2    LA volume (mod) 60.00 cm3    Right Atrial Pressure (from IVC) 3 mmHg    TV rest pulmonary artery pressure 29 mmHg    Narrative    1. Left ventricle: Normal in size with normal systolic function. LV   ejection fraction is 55-60%. There are no significant regional wall motion   abnormalities. Normal LV geometry.  2. Left ventricular diastolic function: Normal.  3. Right ventricle: Normal in size with normal systolic function.   4. Estimated PASP: <40 mmHg normal Estimated RAP: 3 mmHg based on IVC   assessment.  5. Left atrium: Normal in size.  Right atrium: Normal in size  6. Valves: Mild aortic regurgitation  7. Aorta: Normal caliber when indexed for BSA  8. Pericardium: Normal thickness No effusion present.  9. Technical quality is adequate.  10. No prior studies available for comparison     , and X-Ray: CXR: X-Ray Chest 1 View (CXR):   Results for orders placed or performed during the hospital encounter of 09/13/22   X-Ray Chest 1 View    Narrative    EXAMINATION:  XR CHEST 1 VIEW    CLINICAL HISTORY:  CP;    TECHNIQUE:  Single frontal view of the chest was performed.    COMPARISON:  04/20/2022    FINDINGS:  The lungs are clear with normal appearance of pulmonary vasculature. No pleural effusion. No evident pneumothorax.    The cardiac silhouette is normal in size. The hilar and mediastinal contours are unremarkable.    Bones are intact.      Impression    No acute abnormality.      Electronically signed by: Julianna French MD  Date:    09/13/2022  Time:    14:00    and X-Ray Chest PA and Lateral (CXR): No results found for this visit on 09/13/22.    Assessment and Plan:     No notes have been filed under this hospital service.  Service: Cardiology      VTE Risk Mitigation (From  admission, onward)      None          No current facility-administered medications for this encounter.     Current Outpatient Medications   Medication Sig    FLUoxetine 40 MG capsule Take 1 capsule (40 mg total) by mouth once daily.    furosemide (LASIX) 20 MG tablet Take 20 mg by mouth daily as needed.    gabapentin (NEURONTIN) 300 MG capsule Take 1 capsule (300 mg total) by mouth 3 (three) times daily.    methylPREDNISolone (MEDROL DOSEPACK) 4 mg tablet Pack as directed    naproxen (NAPROSYN) 250 MG tablet Take 250 mg by mouth every 8 (eight) hours as needed.    oxybutynin (DITROPAN) 5 MG Tab Take 1 tablet (5 mg total) by mouth 2 (two) times daily.    sulfamethoxazole-trimethoprim 800-160mg (BACTRIM DS) 800-160 mg Tab SMARTSI Tablet(s) By Mouth Every 12 Hours    traZODone (DESYREL) 50 MG tablet Take 1 tablet (50 mg total) by mouth every evening.     Nuclear perfusion study 2020: Probably normal perfusion study limited by breast attenuation.r     Echocardiogram 2020: LVEF 55-60, no regional wall abnormalities, renal function normal, pulmonary artery systolic pressure less 40 mm Hg.      Diagnosis: L neck, shoulder and Chest pain with tender L shoulder , also with ext rotation, troponin normal EKG without any acute ischemic changes MPI normal 2022. Also belching  Chronic atypical chest pain.  ACS unlikely. Very stable and comfortable. Low risk group.  Dyslipidemia,  2022,      Plan:  Ruled out for AMI.  OK to DC home with close follow up in clinic.    Thank you for your consult. I will follow-up with patient. Please contact us if you have any additional questions.      Transcribed by  Abe Najera NP for Dr BRANDON Larson  Cardiology    [facility] - Emergency Dept  I attest that I have personally seen and examined this patient. I have reviewed and discussed the management in detail as outlined above.

## 2022-09-14 NOTE — ED PROVIDER NOTES
"Encounter Date: 9/13/2022       History     Chief Complaint   Patient presents with    Chest Pain     Patient to ER CP that stared today, describes it as a sharp pain, 10/10 pain      61-year-old female presents with left chest wall pain in the emergency room today  Patient states that she has left chest wall pain and neck pain, denies trauma history  Patient denies any actual coronary artery disease history, normal sinus rhythm on EKG  Patient states any movement activity elicits left chest wall pain this afternoon here  Addition the patient has been belching with longstanding issues with GERD issues    Review of patient's allergies indicates:   Allergen Reactions    Lortab [hydrocodone-acetaminophen] Itching     STATES "CAN'T TAKE LORTAB 5 MG,BUT CAN TAKE LORTAB 10 MG     Past Medical History:   Diagnosis Date    Back pain     Bronchitis     Contact dermatitis     Corns and callosities     Gout     Lymphedema of both lower extremities     Morbid obesity     RA (rheumatoid arthritis)     Tinea pedis of right foot      Past Surgical History:   Procedure Laterality Date    CARPAL TUNNEL RELEASE  2009    left    COLONOSCOPY N/A 10/20/2020    Procedure: COLONOSCOPY;  Surgeon: Luis A Luu MD;  [facility];  Service: General;  Laterality: N/A;    HYSTERECTOMY      JOINT REPLACEMENT      partial hysterctomy  2010    SHOULDER SURGERY      TOTAL KNEE ARTHROPLASTY  4/4/11    left    TUBAL LIGATION       Family History   Problem Relation Age of Onset    Cancer Mother         Breast (?cured), then liver and bone    Hypertension Mother     Breast cancer Mother     Hypertension Father     Stroke Father     Colon cancer Father     Hypertension Sister     Hypertension Brother     Diabetes Neg Hx      Social History     Tobacco Use    Smoking status: Every Day     Packs/day: 1.00     Years: 30.00     Pack years: 30.00     Types: Cigarettes    Smokeless tobacco: Never   Substance Use Topics    Alcohol use: Yes     " Comment: occasional light drinker    Drug use: No     Review of Systems   Constitutional: Negative.    HENT: Negative.     Eyes: Negative.    Respiratory:  Positive for chest tightness.    Cardiovascular:  Positive for chest pain.   Gastrointestinal: Negative.    Genitourinary: Negative.    Musculoskeletal:  Positive for neck pain.   Skin: Negative.    Neurological: Negative.    Psychiatric/Behavioral: Negative.       Physical Exam     Initial Vitals [09/13/22 1332]   BP Pulse Resp Temp SpO2   (!) 183/73 60 18 97 °F (36.1 °C) 96 %      MAP       --         Physical Exam    Nursing note and vitals reviewed.  Constitutional: Vital signs are normal. She appears well-developed and well-nourished. She is cooperative.   HENT:   Head: Normocephalic and atraumatic.   Right Ear: External ear normal.   Left Ear: External ear normal.   Nose: Nose normal.   Mouth/Throat: Oropharynx is clear and moist.   Eyes: Conjunctivae, EOM and lids are normal. Pupils are equal, round, and reactive to light.   Neck: Trachea normal and phonation normal. Neck supple. No JVD present.   Normal range of motion.   Full passive range of motion without pain.     Cardiovascular:  Normal rate, regular rhythm, S1 normal, S2 normal, normal heart sounds, intact distal pulses and normal pulses.           Pulmonary/Chest: Effort normal and breath sounds normal.   Abdominal: Abdomen is soft and flat. Bowel sounds are normal.   Musculoskeletal:         General: Normal range of motion.      Cervical back: Full passive range of motion without pain, normal range of motion and neck supple.     Neurological: She is alert and oriented to person, place, and time. She has normal strength.   Skin: Skin is warm, dry and intact. Capillary refill takes less than 2 seconds.       ED Course   Procedures  Labs Reviewed   COMPREHENSIVE METABOLIC PANEL - Abnormal; Notable for the following components:       Result Value    CO2 30 (*)     Anion Gap 7 (*)     All other  components within normal limits   CBC W/ AUTO DIFFERENTIAL - Abnormal; Notable for the following components:    MCHC 31.8 (*)     RDW 16.0 (*)     All other components within normal limits    Narrative:     Recoll. [id number] by FLORES at 09/13/2022 14:31, reason: specimen   clotted   D DIMER, QUANTITATIVE - Abnormal; Notable for the following components:    D-Dimer 0.53 (*)     All other components within normal limits   TROPONIN I   CK   B-TYPE NATRIURETIC PEPTIDE   TROPONIN I     EKG Readings: (Independently Interpreted)   Initial Reading: No STEMI. Rhythm: Normal Sinus Rhythm. Ectopy: No Ectopy. Conduction: Normal. ST Segments: Normal ST Segments. T Waves: Normal. Axis: Normal.     ECG Results              EKG 12-lead (Final result)  Result time 09/13/22 15:40:31      Final result by [facility] (09/13/22 15:40:31)                   Narrative:    Test Reason : R07.9    Vent. Rate : 055 BPM     Atrial Rate : 055 BPM     P-R Int : 120 ms          QRS Dur : 096 ms      QT Int : 440 ms       P-R-T Axes : 077 072 088 degrees     QTc Int : 420 ms    Sinus bradycardia  Otherwise normal ECG  When compared with ECG of 20-APR-2022 08:56,  Vent. rate has decreased BY  41 BPM    Confirmed by Jerad Marte MD ([id number]) on 9/13/2022 3:40:24 PM    Referred By:  SRAVANTHI           Confirmed By:Jerad Marte MD                                  Imaging Results              X-Ray Chest 1 View (Final result)  Result time 09/13/22 14:00:48      Final result by Julianna French MD (09/13/22 14:00:48)                   Impression:      No acute abnormality.      Electronically signed by: Julianna French MD  Date:    09/13/2022  Time:    14:00               Narrative:    EXAMINATION:  XR CHEST 1 VIEW    CLINICAL HISTORY:  CP;    TECHNIQUE:  Single frontal view of the chest was performed.    COMPARISON:  04/20/2022    FINDINGS:  The lungs are clear with normal appearance of pulmonary vasculature. No pleural effusion. No  evident pneumothorax.    The cardiac silhouette is normal in size. The hilar and mediastinal contours are unremarkable.    Bones are intact.                                       Medications   aspirin tablet 325 mg (325 mg Oral Given 9/13/22 1354)   ondansetron injection 4 mg (4 mg Intravenous Given 9/13/22 1804)   meperidine (PF) injection 25 mg (25 mg Intravenous Given 9/13/22 1805)     Medical Decision Making:   Initial Assessment:   Left chest wall pain for last couple hours, belching on ER triage today  Patient reports left neck pain with any movement activity this evening  Patient also has left chest wall pain with any deep breath or movement  Pt has no coronary artery disease history, normal sinus rhythm on EKG    Differential Diagnosis:   Angina, STEMI, non-STEMI, pinched nerve, radiculopathy, chest wall pain, costochondritis    Clinical Tests:   Lab Tests: Ordered and Reviewed  Radiological Study: Ordered and Reviewed  Medical Tests: Ordered and Reviewed    ED Management:  Negative cardiac workup including 2 troponins and an EKG in the emergency room today  Equivocal D-dimer with recent CT PE study that was negative, activity induced pain  [facility] cardiology consult in the ER with Dr. Uche Larson, likely musculoskeletal pain  Patient also has GERD type symptoms, he would like us to prescribe Protonix on DC  Patient is asymptomatic on discharge Will follow-up with Cardiology outpatient  Patient should also follow-up with gastroenterology for chronic GERD symptoms   Return to ER with any concerning signs or symptoms after discharge                        Clinical Impression:   Final diagnoses:  [R07.89] Chest wall pain (Primary)  [K21.9] Gastroesophageal reflux disease without esophagitis        ED Disposition Condition    Discharge Stable          ED Prescriptions       Medication Sig Dispense Start Date End Date Auth. Provider    cyclobenzaprine (FLEXERIL) 10 MG tablet Take 1 tablet (10 mg total) by mouth 3  (three) times daily as needed for Muscle spasms. 10 tablet 9/13/2022 9/18/2022 Phi Covington MD    pantoprazole (PROTONIX) 40 MG tablet Take 1 tablet (40 mg total) by mouth once daily. 30 tablet 9/13/2022 10/13/2022 Phi Covington MD    ibuprofen (ADVIL,MOTRIN) 800 MG tablet Take 1 tablet (800 mg total) by mouth every 6 (six) hours as needed for Pain. 20 tablet 9/13/2022 -- Phi Covington MD          Follow-up Information       Follow up With Specialties Details Why Contact Info    Carla Pemberton MD Internal Medicine Schedule an appointment as soon as possible for a visit in 2 days  [address]  [place] [address]  [phone]      Uche Larson MD Cardiology Schedule an appointment as soon as possible for a visit in 2 days  [address] [place] [address]  [phone]      Yosef Scott MD Internal Medicine, Gastroenterology Go in 2 days  [address]  [address]  [place] [address]  [phone]               Phi Covington MD  09/13/22 2022

## 2022-09-26 ENCOUNTER — HOSPITAL ENCOUNTER (OUTPATIENT)
Dept: RADIOLOGY | Facility: HOSPITAL | Age: 62
Discharge: HOME | End: 2022-09-26
Attending: OBSTETRICS & GYNECOLOGY
Payer: MEDICARE

## 2022-09-26 VITALS — WEIGHT: 262 LBS | HEIGHT: 64 IN | BODY MASS INDEX: 44.73 KG/M2

## 2022-09-26 DIAGNOSIS — Z12.31 ENCOUNTER FOR SCREENING MAMMOGRAM FOR BREAST CANCER: ICD-10-CM

## 2022-09-26 PROCEDURE — 77067 SCR MAMMO BI INCL CAD: CPT | Mod: TC

## 2022-09-26 PROCEDURE — 77063 MAMMO DIGITAL SCREENING BILAT WITH TOMO: ICD-10-PCS | Mod: 26,,, | Performed by: RADIOLOGY

## 2022-09-26 PROCEDURE — 77063 BREAST TOMOSYNTHESIS BI: CPT | Mod: TC

## 2022-09-26 PROCEDURE — 77067 SCR MAMMO BI INCL CAD: CPT | Mod: 26,,, | Performed by: RADIOLOGY

## 2022-09-26 PROCEDURE — 77063 BREAST TOMOSYNTHESIS BI: CPT | Mod: 26,,, | Performed by: RADIOLOGY

## 2022-09-26 PROCEDURE — 77067 MAMMO DIGITAL SCREENING BILAT WITH TOMO: ICD-10-PCS | Mod: 26,,, | Performed by: RADIOLOGY

## 2022-11-24 ENCOUNTER — HOSPITAL ENCOUNTER (EMERGENCY)
Facility: HOSPITAL | Age: 62
Discharge: HOME | End: 2022-11-24
Attending: SURGERY
Payer: MEDICARE

## 2022-11-24 VITALS
SYSTOLIC BLOOD PRESSURE: 131 MMHG | OXYGEN SATURATION: 98 % | WEIGHT: 251.31 LBS | RESPIRATION RATE: 18 BRPM | BODY MASS INDEX: 43.14 KG/M2 | DIASTOLIC BLOOD PRESSURE: 71 MMHG | TEMPERATURE: 102 F | HEART RATE: 91 BPM

## 2022-11-24 DIAGNOSIS — J10.1 INFLUENZA A: Primary | ICD-10-CM

## 2022-11-24 LAB
GROUP A STREP, MOLECULAR: NEGATIVE
INFLUENZA A, MOLECULAR: POSITIVE
INFLUENZA B, MOLECULAR: NEGATIVE
SARS-COV-2 RDRP RESP QL NAA+PROBE: NEGATIVE
SPECIMEN SOURCE: ABNORMAL

## 2022-11-24 PROCEDURE — 25000003 PHARM REV CODE 250: Performed by: SURGERY

## 2022-11-24 PROCEDURE — 87651 STREP A DNA AMP PROBE: CPT | Performed by: SURGERY

## 2022-11-24 PROCEDURE — 87502 INFLUENZA DNA AMP PROBE: CPT | Performed by: SURGERY

## 2022-11-24 PROCEDURE — U0002 COVID-19 LAB TEST NON-CDC: HCPCS | Performed by: SURGERY

## 2022-11-24 PROCEDURE — 99284 EMERGENCY DEPT VISIT MOD MDM: CPT

## 2022-11-24 RX ORDER — BENZONATATE 100 MG/1
200 CAPSULE ORAL 3 TIMES DAILY PRN
Qty: 20 CAPSULE | Refills: 0 | Status: SHIPPED | OUTPATIENT
Start: 2022-11-24 | End: 2022-12-04

## 2022-11-24 RX ORDER — OSELTAMIVIR PHOSPHATE 75 MG/1
75 CAPSULE ORAL 2 TIMES DAILY
Qty: 10 CAPSULE | Refills: 0 | Status: SHIPPED | OUTPATIENT
Start: 2022-11-24 | End: 2022-11-29

## 2022-11-24 RX ORDER — IBUPROFEN 800 MG/1
800 TABLET ORAL
Status: COMPLETED | OUTPATIENT
Start: 2022-11-24 | End: 2022-11-24

## 2022-11-24 RX ORDER — ACETAMINOPHEN 500 MG
1000 TABLET ORAL
Status: COMPLETED | OUTPATIENT
Start: 2022-11-24 | End: 2022-11-24

## 2022-11-24 RX ADMIN — ACETAMINOPHEN 1000 MG: 500 TABLET ORAL at 02:11

## 2022-11-24 RX ADMIN — IBUPROFEN 800 MG: 800 TABLET, FILM COATED ORAL at 02:11

## 2022-11-24 NOTE — ED PROVIDER NOTES
"[facility]                                                 I reviewed the ER triage nurse's note before evaluating the patient    Chief Complaint  61 y.o. female with General Illness     History of Present Illness  Christina Townsend presents to the emergency room with nasal congestion  Nasal congestion cough cold symptoms with no wheezing or sputum on triage  No wheezing, no sputum, no hypoxia, clear lung sounds on evaluation today  No nausea vomiting diarrhea, no signs of distress on ER evaluation today    The history is provided by the patient  Previous medical records were [facility] from [facility]  Previous records are summarized from prior ER visits and hospitalizations    Past Medical History:   Diagnosis Date    Back pain     Bronchitis     Contact dermatitis     Corns and callosities     Gout     Lymphedema of both lower extremities     Morbid obesity     RA (rheumatoid arthritis)     Tinea pedis of right foot      Past Surgical History:   Procedure Laterality Date    CARPAL TUNNEL RELEASE  2009    left    COLONOSCOPY N/A 10/20/2020    Procedure: COLONOSCOPY;  Surgeon: Luis A Luu MD;  Location: [facility];  Service: General;  Laterality: N/A;    HYSTERECTOMY      JOINT REPLACEMENT      partial hysterctomy  2010    SHOULDER SURGERY      TOTAL KNEE ARTHROPLASTY  4/4/11    left    TUBAL LIGATION        Review of patient's allergies indicates:   Allergen Reactions    Lortab [hydrocodone-acetaminophen] Itching     STATES "CAN'T TAKE LORTAB 5 MG,BUT CAN TAKE LORTAB 10 MG     No significant family history  No significant social history, nonsmoker    I have reviewed all of this patient's past medical, surgical, family, and social   histories as well as active allergies and medications documented in the  electronic medical record    Review of Systems and Physical Exam      Review of Systems (all other ROS are otherwise negative)  -- Constitution - subjective fever, denies fatigue, no weakness, " no chills  -- Eyes - no tearing or redness, no visual disturbance  -- Ear, Nose - sneezing, nasal congestion and clear discharge   -- Mouth,Throat - sore throat, no toothache, normal voice, normal swallowing  -- Respiratory - cough and congestion, no shortness of breath, no sputum  -- Cardiovascular - denies chest pain, no palpitations, denies claudication  -- Gastrointestinal - denies abdominal pain, nausea, vomiting, or diarrhea  -- Genitourinary - no dysuria, no hematuria, no flank pain, no bladder pain  -- Musculoskeletal - denies back pain, negative for trauma or injury  -- Neurological - no headache, denies weakness or seizure; no LOC  -- Skin - denies pallor, rash, or changes in skin. no hives or welts noted     Vital Signs (reviewed by the physician)  Her temperature is 101.9 °F (38.8 °C)   Her blood pressure is 131/71 and her pulse is 91.   Her respiration is 18 and oxygen saturation is 98%.     Physical Exam  -- Nursing note and vitals reviewed  -- Constitutional: Appears well-developed and well-nourished  -- Head: Atraumatic. Normocephalic. No obvious abnormality  -- Eyes: Pupils are equal and reactive to light. Normal conjunctiva and lids  -- Nose: nasal mucosa erythema and edema; clear nasal discharge noted   -- Throat: post-nasal drip with mild posterior oropharnyx erythema  -- Ears: External ears and TM normal bilaterally. Normal hearing and no drainage  -- Neck: Normal range of motion. Neck supple. No masses, trachea midline  -- Cardiac: Normal rate, regular rhythm and normal heart sounds  -- Respiratory: Normal respiratory effort, breath sounds clear to auscultation  -- Gastrointestinal: Soft, no tenderness. Normal bowel sounds. Normal liver edge  -- Musculoskeletal: Normal range of motion, no effusions. Joints stable   -- Neurological: No focal deficits. Showed good interaction with staff  -- Vascular: Posterior tibial, dorsalis pedis and radial pulses 2+ bilaterally       Emergency Room Course       Treatment and Evaluation  -- rapid Coronavirus PCR was negative   -- (+) for influenza in the emergency room  -- The strep screen was negative     Assessment, Disposition, & Plan      Diagnosis  [J10.1] Influenza A (Primary)    Disposition and Plan  -- Disposition: home  -- Condition: stable  -- Follow-up: Patient to follow up with Carla Pemberton MD in 1-2 days.  -- I advised the patient that we have found no life threatening condition today  -- At this time, I believe the patient is clinically stable for discharge.   -- Pt understands that the visit today is to address immediate concerns   -- Further workup and evaluation as an outpatient may be required  -- The patient acknowledges that close follow up with a MD is required   -- Patient agrees to comply with all instruction and direction given in the ER    This note is dictated on M*Modal word recognition program.  There are word recognition mistakes that are occasionally missed on review.           Phi Covington MD  11/24/22 1697

## 2023-08-18 ENCOUNTER — HOSPITAL ENCOUNTER (EMERGENCY)
Facility: HOSPITAL | Age: 63
Discharge: HOME | End: 2023-08-19
Attending: SURGERY
Payer: MEDICARE

## 2023-08-18 VITALS
DIASTOLIC BLOOD PRESSURE: 67 MMHG | OXYGEN SATURATION: 93 % | WEIGHT: 255.63 LBS | SYSTOLIC BLOOD PRESSURE: 144 MMHG | BODY MASS INDEX: 43.64 KG/M2 | HEIGHT: 64 IN | TEMPERATURE: 99 F | RESPIRATION RATE: 19 BRPM | HEART RATE: 58 BPM

## 2023-08-18 DIAGNOSIS — J02.9 PHARYNGITIS, UNSPECIFIED ETIOLOGY: Primary | ICD-10-CM

## 2023-08-18 LAB
GROUP A STREP, MOLECULAR: NEGATIVE
INFLUENZA A, MOLECULAR: NEGATIVE
INFLUENZA B, MOLECULAR: NEGATIVE
SARS-COV-2 RDRP RESP QL NAA+PROBE: NEGATIVE
SPECIMEN SOURCE: NORMAL

## 2023-08-18 PROCEDURE — 87651 STREP A DNA AMP PROBE: CPT | Performed by: SURGERY

## 2023-08-18 PROCEDURE — 87502 INFLUENZA DNA AMP PROBE: CPT | Performed by: SURGERY

## 2023-08-18 PROCEDURE — 96372 THER/PROPH/DIAG INJ SC/IM: CPT | Performed by: SURGERY

## 2023-08-18 PROCEDURE — 63600175 PHARM REV CODE 636 W HCPCS: Performed by: SURGERY

## 2023-08-18 PROCEDURE — U0002 COVID-19 LAB TEST NON-CDC: HCPCS | Performed by: SURGERY

## 2023-08-18 PROCEDURE — 99284 EMERGENCY DEPT VISIT MOD MDM: CPT

## 2023-08-18 RX ORDER — AMOXICILLIN AND CLAVULANATE POTASSIUM 875; 125 MG/1; MG/1
1 TABLET, FILM COATED ORAL 2 TIMES DAILY
Qty: 20 TABLET | Refills: 0 | Status: SHIPPED | OUTPATIENT
Start: 2023-08-18 | End: 2023-08-28

## 2023-08-18 RX ORDER — PREDNISONE 20 MG/1
40 TABLET ORAL DAILY
Qty: 10 TABLET | Refills: 0 | Status: SHIPPED | OUTPATIENT
Start: 2023-08-18 | End: 2023-08-23

## 2023-08-18 RX ORDER — KETOROLAC TROMETHAMINE 30 MG/ML
60 INJECTION, SOLUTION INTRAMUSCULAR; INTRAVENOUS
Status: COMPLETED | OUTPATIENT
Start: 2023-08-18 | End: 2023-08-18

## 2023-08-18 RX ORDER — CYCLOBENZAPRINE HCL 10 MG
10 TABLET ORAL NIGHTLY
COMMUNITY
Start: 2023-08-15

## 2023-08-18 RX ORDER — METHYLPREDNISOLONE SOD SUCC 125 MG
125 VIAL (EA) INJECTION
Status: COMPLETED | OUTPATIENT
Start: 2023-08-18 | End: 2023-08-18

## 2023-08-18 RX ORDER — BENZONATATE 100 MG/1
200 CAPSULE ORAL 3 TIMES DAILY PRN
Qty: 20 CAPSULE | Refills: 0 | Status: SHIPPED | OUTPATIENT
Start: 2023-08-18 | End: 2023-08-28

## 2023-08-18 RX ORDER — TRAMADOL HYDROCHLORIDE 50 MG/1
50 TABLET ORAL DAILY PRN
COMMUNITY
Start: 2023-07-27

## 2023-08-18 RX ORDER — ASPIRIN 81 MG/1
81 TABLET ORAL DAILY
COMMUNITY

## 2023-08-18 RX ADMIN — KETOROLAC TROMETHAMINE 60 MG: 30 INJECTION, SOLUTION INTRAMUSCULAR at 11:08

## 2023-08-18 RX ADMIN — METHYLPREDNISOLONE SODIUM SUCCINATE 125 MG: 125 INJECTION, POWDER, FOR SOLUTION INTRAMUSCULAR; INTRAVENOUS at 11:08

## 2023-08-19 NOTE — ED PROVIDER NOTES
"Encounter Date: 8/18/2023       History     Chief Complaint   Patient presents with    URI     Pt to ED with c/o cough, congestion, headache and sore throat that began this AM.      62-year-old female presents with nasal congestion cough cold today  Patient also with a sore throat with mild pharyngitis on clinical exam  No stridor or drooling with normal phonation & normal swallowing today  No signs of peritonsillar abscess, no halitosis or hot potato voice noted  Patient with no angioedema or signs of Lloyd's angina on clinical exam        Review of patient's allergies indicates:   Allergen Reactions    Lortab [hydrocodone-acetaminophen] Itching     STATES "CAN'T TAKE LORTAB 5 MG,BUT CAN TAKE LORTAB 10 MG     Past Medical History:   Diagnosis Date    Back pain     Bronchitis     Contact dermatitis     Corns and callosities     Gout     Lymphedema of both lower extremities     Morbid obesity     RA (rheumatoid arthritis)     Tinea pedis of right foot      Past Surgical History:   Procedure Laterality Date    CARPAL TUNNEL RELEASE  2009    left    COLONOSCOPY N/A 10/20/2020    Procedure: COLONOSCOPY;  Surgeon: Luis A Luu MD;  Location: [facility];  Service: General;  Laterality: N/A;    HYSTERECTOMY      JOINT REPLACEMENT      partial hysterctomy  2010    SHOULDER SURGERY      TOTAL KNEE ARTHROPLASTY  4/4/11    left    TUBAL LIGATION       Family History   Problem Relation Age of Onset    Cancer Mother         Breast (?cured), then liver and bone    Hypertension Mother     Breast cancer Mother     Hypertension Father     Stroke Father     Colon cancer Father     Hypertension Sister     Hypertension Brother     Diabetes Neg Hx      Social History     Tobacco Use    Smoking status: Every Day     Current packs/day: 1.00     Average packs/day: 1 pack/day for 30.0 years (30.0 ttl pk-yrs)     Types: Cigarettes    Smokeless tobacco: Never   Substance Use Topics    Alcohol use: Yes     Comment: occasional light drinker "    Drug use: No     Review of Systems   Constitutional: Negative.    HENT:  Positive for congestion and sore throat.    Eyes: Negative.    Respiratory:  Positive for cough.    Cardiovascular: Negative.    Gastrointestinal: Negative.    Genitourinary:  Negative for dysuria, urgency and vaginal discharge.   Skin: Negative.    Neurological:  Positive for headaches.   Psychiatric/Behavioral: Negative.     All other systems reviewed and are negative.      Physical Exam     Initial Vitals [08/18/23 2249]   BP Pulse Resp Temp SpO2   (!) 169/73 70 18 98.6 °F (37 °C) 98 %      MAP       --         Physical Exam    Nursing note and vitals reviewed.  Constitutional: Vital signs are normal. She appears well-developed and well-nourished. She is cooperative.   HENT:   Head: Normocephalic and atraumatic.   Right Ear: External ear normal.   Left Ear: External ear normal.   Nose: Nose normal.   (+) mild pharyngitis without tonsillitis or exudate   Eyes: Conjunctivae, EOM and lids are normal. Pupils are equal, round, and reactive to light.   Neck: Trachea normal and phonation normal. Neck supple. No JVD present.   Normal range of motion.   Full passive range of motion without pain.     Cardiovascular:  Normal rate, regular rhythm, S1 normal, S2 normal, normal heart sounds, intact distal pulses and normal pulses.           Pulmonary/Chest: Effort normal and breath sounds normal.   Abdominal: Abdomen is soft and flat. Bowel sounds are normal.   Musculoskeletal:         General: Normal range of motion.      Cervical back: Full passive range of motion without pain, normal range of motion and neck supple.     Neurological: She is alert and oriented to person, place, and time. She has normal strength.   Skin: Skin is warm, dry and intact. Capillary refill takes less than 2 seconds.         ED Course   Procedures  Labs Reviewed   INFLUENZA A & B BY MOLECULAR   GROUP A STREP, MOLECULAR   SARS-COV-2 RNA AMPLIFICATION, QUAL          Imaging  Results    None          Medications   methylPREDNISolone sodium succinate injection 125 mg (125 mg Intramuscular Given 8/18/23 2336)   ketorolac injection 60 mg (60 mg Intramuscular Given 8/18/23 2336)     Medical Decision Making  60-year-old female with cough, sore throat, headache this evening   Differential includes flu, strep, COVID, bronchitis, URI, migraine headache    Problems Addressed:  Pharyngitis, unspecified etiology: complicated acute illness or injury    Amount and/or Complexity of Data Reviewed  Labs: ordered. Decision-making details documented in ED Course.    ED Management & Risk of Complications, Morbidity, Mortality:  (-) swabs in the ER, steroid & Toradol injection in the ER today  Prescription for antibiotic & steroid with Tessalon Perles on discharge  Tylenol at home for pain with close PCP follow-up in next 48 hours  This patient does not need to be hospitalized on ER evaluation today  The patient's diagnosis is not limited by social determinants of health  Does not require surgery or procedure (major or minor), no risk factors  Patient counseled to return to the ER with any concerning symptoms                            Clinical Impression:   Final diagnoses:  [J02.9] Pharyngitis, unspecified etiology (Primary)        ED Disposition Condition    Discharge Stable          ED Prescriptions       Medication Sig Dispense Start Date End Date Auth. Provider    amoxicillin-clavulanate 875-125mg (AUGMENTIN) 875-125 mg per tablet Take 1 tablet by mouth 2 (two) times daily. for 10 days 20 tablet 8/18/2023 8/28/2023 Phi Covington MD    predniSONE (DELTASONE) 20 MG tablet Take 2 tablets (40 mg total) by mouth once daily. for 5 days 10 tablet 8/18/2023 8/23/2023 Phi Covington MD    benzonatate (TESSALON) 100 MG capsule Take 2 capsules (200 mg total) by mouth 3 (three) times daily as needed for Cough. 20 capsule 8/18/2023 8/28/2023 Phi Covington MD          Follow-up Information    None           Phi Covington MD  08/18/23 2417

## 2024-07-21 ENCOUNTER — HOSPITAL ENCOUNTER (EMERGENCY)
Facility: HOSPITAL | Age: 64
Discharge: HOME | End: 2024-07-21
Attending: STUDENT IN AN ORGANIZED HEALTH CARE EDUCATION/TRAINING PROGRAM
Payer: MEDICARE

## 2024-07-21 VITALS
TEMPERATURE: 98 F | HEART RATE: 80 BPM | DIASTOLIC BLOOD PRESSURE: 82 MMHG | SYSTOLIC BLOOD PRESSURE: 156 MMHG | RESPIRATION RATE: 18 BRPM | OXYGEN SATURATION: 99 %

## 2024-07-21 DIAGNOSIS — M79.671 RIGHT FOOT PAIN: ICD-10-CM

## 2024-07-21 PROCEDURE — 63600175 PHARM REV CODE 636 W HCPCS: Performed by: STUDENT IN AN ORGANIZED HEALTH CARE EDUCATION/TRAINING PROGRAM

## 2024-07-21 PROCEDURE — 96372 THER/PROPH/DIAG INJ SC/IM: CPT | Performed by: STUDENT IN AN ORGANIZED HEALTH CARE EDUCATION/TRAINING PROGRAM

## 2024-07-21 PROCEDURE — 99284 EMERGENCY DEPT VISIT MOD MDM: CPT | Mod: 25

## 2024-07-21 RX ORDER — KETOROLAC TROMETHAMINE 30 MG/ML
15 INJECTION, SOLUTION INTRAMUSCULAR; INTRAVENOUS
Status: COMPLETED | OUTPATIENT
Start: 2024-07-21 | End: 2024-07-21

## 2024-07-21 RX ORDER — DEXAMETHASONE SODIUM PHOSPHATE 4 MG/ML
8 INJECTION, SOLUTION INTRA-ARTICULAR; INTRALESIONAL; INTRAMUSCULAR; INTRAVENOUS; SOFT TISSUE
Status: COMPLETED | OUTPATIENT
Start: 2024-07-21 | End: 2024-07-21

## 2024-07-21 RX ADMIN — DEXAMETHASONE SODIUM PHOSPHATE 8 MG: 4 INJECTION, SOLUTION INTRA-ARTICULAR; INTRALESIONAL; INTRAMUSCULAR; INTRAVENOUS; SOFT TISSUE at 09:07

## 2024-07-21 RX ADMIN — KETOROLAC TROMETHAMINE 15 MG: 30 INJECTION, SOLUTION INTRAMUSCULAR at 09:07

## 2024-07-22 NOTE — ED PROVIDER NOTES
"Encounter Date: 7/21/2024       History     Chief Complaint   Patient presents with    Foot Injury     Pt to ED c/o nontraumatic right foot pain since last night. Took aspirin pta.      63-year-old female with history of gout, presenting with right foot pain that began yesterday.  Patient reports that she was at a family gathering, and spent most of the day on her feet.  Denies any trauma.  No numbness or weakness.  No other complaints.      Review of patient's allergies indicates:   Allergen Reactions    Lortab [hydrocodone-acetaminophen] Itching     STATES "CAN'T TAKE LORTAB 5 MG,BUT CAN TAKE LORTAB 10 MG     Past Medical History:   Diagnosis Date    Back pain     Bronchitis     Contact dermatitis     Corns and callosities     Gout     Lymphedema of both lower extremities     Morbid obesity     RA (rheumatoid arthritis)     Tinea pedis of right foot      Past Surgical History:   Procedure Laterality Date    CARPAL TUNNEL RELEASE  2009    left    COLONOSCOPY N/A 10/20/2020    Procedure: COLONOSCOPY;  Surgeon: Luis A Luu MD;  Location: [facility];  Service: General;  Laterality: N/A;    HYSTERECTOMY      JOINT REPLACEMENT      partial hysterctomy  2010    SHOULDER SURGERY      TOTAL KNEE ARTHROPLASTY  4/4/11    left    TUBAL LIGATION       Family History   Problem Relation Name Age of Onset    Cancer Mother          Breast (?cured), then liver and bone    Hypertension Mother      Breast cancer Mother      Hypertension Father      Stroke Father      Colon cancer Father      Hypertension Sister      Hypertension Brother      Diabetes Neg Hx       Social History     Tobacco Use    Smoking status: Every Day     Current packs/day: 1.00     Average packs/day: 1 pack/day for 30.0 years (30.0 ttl pk-yrs)     Types: Cigarettes    Smokeless tobacco: Never   Substance Use Topics    Alcohol use: Yes     Comment: occasional light drinker    Drug use: No     Review of Systems   Constitutional:  Negative for fever.   HENT:  " Negative for sore throat.    Respiratory:  Negative for shortness of breath.    Cardiovascular:  Negative for chest pain.   Gastrointestinal:  Negative for nausea.   Genitourinary:  Negative for dysuria.   Musculoskeletal:  Negative for back pain.        Right foot pain   Skin:  Negative for rash.   Neurological:  Negative for weakness.   Hematological:  Does not bruise/bleed easily.       Physical Exam     Initial Vitals [07/21/24 2111]   BP Pulse Resp Temp SpO2   (!) 156/82 80 18 98.1 °F (36.7 °C) 99 %      MAP       --         Physical Exam    Nursing note and vitals reviewed.  Constitutional: She appears well-developed.   HENT:   Head: Normocephalic.   Eyes: Pupils are equal, round, and reactive to light.   Neck:   Normal range of motion.  Cardiovascular:            No murmur heard.  Pulmonary/Chest: No respiratory distress.   Abdominal: Abdomen is soft.   Musculoskeletal:         General: No edema.      Cervical back: Normal range of motion.      Comments: Tenderness to palpation along the plantar section of patient's foot.  Mild midfoot tenderness to palpation.  No ankle tenderness to palpation.  Sensation fully intact throughout extremity.  DP pulse 2 +.  Cap refill normal.  Compartments soft.     Neurological: She is alert.   Skin: Skin is warm.   Psychiatric: She has a normal mood and affect.         ED Course   Procedures  Labs Reviewed - No data to display       Imaging Results              X-Ray Foot Complete Right (In process)                      Medications   ketorolac injection 15 mg (has no administration in time range)   dexAMETHasone injection 8 mg (has no administration in time range)     Medical Decision Making  DDX:  Right foot pain.  Concern for possible plantar sprain.  Will rule out nontraumatic subtle fracture from extensive use.  Neurovascularly intact.  Possible gout exacerbation, however this appears less likely given the fact that it is not localized around a single joint.  DX:   X-ray  TX:  Analgesia PRN  Dispo:  Pending workup        Amount and/or Complexity of Data Reviewed  Radiology: ordered.    Risk  Prescription drug management.                                      Clinical Impression:  Final diagnoses:  [M79.671] Right foot pain                 Eric Schaffer MD  07/21/24 6437

## 2024-07-28 ENCOUNTER — HOSPITAL ENCOUNTER (EMERGENCY)
Facility: HOSPITAL | Age: 64
Discharge: HOME | End: 2024-07-29
Payer: MEDICARE

## 2024-07-28 ENCOUNTER — NURSE TRIAGE (OUTPATIENT)
Dept: ADMINISTRATIVE | Facility: CLINIC | Age: 64
End: 2024-07-28
Payer: MEDICARE

## 2024-07-28 DIAGNOSIS — J40 BRONCHITIS: Primary | ICD-10-CM

## 2024-07-28 DIAGNOSIS — R07.9 CHEST PAIN: ICD-10-CM

## 2024-07-28 LAB
ALBUMIN SERPL BCP-MCNC: 3.3 G/DL (ref 3.5–5.2)
ALP SERPL-CCNC: 75 U/L (ref 55–135)
ALT SERPL W/O P-5'-P-CCNC: 36 U/L (ref 10–44)
ANION GAP SERPL CALC-SCNC: 13 MMOL/L (ref 8–16)
AST SERPL-CCNC: 37 U/L (ref 10–40)
BASOPHILS # BLD AUTO: 0.03 K/UL (ref 0–0.2)
BASOPHILS NFR BLD: 0.5 % (ref 0–1.9)
BILIRUB SERPL-MCNC: 0.4 MG/DL (ref 0.1–1)
BUN SERPL-MCNC: 9 MG/DL (ref 8–23)
CALCIUM SERPL-MCNC: 10.1 MG/DL (ref 8.7–10.5)
CHLORIDE SERPL-SCNC: 106 MMOL/L (ref 95–110)
CO2 SERPL-SCNC: 22 MMOL/L (ref 23–29)
CREAT SERPL-MCNC: 0.9 MG/DL (ref 0.5–1.4)
D DIMER PPP IA.FEU-MCNC: 1.22 MG/L FEU
DIFFERENTIAL METHOD BLD: ABNORMAL
EOSINOPHIL # BLD AUTO: 0.3 K/UL (ref 0–0.5)
EOSINOPHIL NFR BLD: 5.1 % (ref 0–8)
ERYTHROCYTE [DISTWIDTH] IN BLOOD BY AUTOMATED COUNT: 15.7 % (ref 11.5–14.5)
EST. GFR  (NO RACE VARIABLE): >60 ML/MIN/1.73 M^2
GLUCOSE SERPL-MCNC: 96 MG/DL (ref 70–110)
GROUP A STREP, MOLECULAR: NEGATIVE
HCT VFR BLD AUTO: 44.6 % (ref 37–48.5)
HGB BLD-MCNC: 14.6 G/DL (ref 12–16)
IMM GRANULOCYTES # BLD AUTO: 0.03 K/UL (ref 0–0.04)
IMM GRANULOCYTES NFR BLD AUTO: 0.5 % (ref 0–0.5)
INFLUENZA A, MOLECULAR: NEGATIVE
INFLUENZA B, MOLECULAR: NEGATIVE
LYMPHOCYTES # BLD AUTO: 2.5 K/UL (ref 1–4.8)
LYMPHOCYTES NFR BLD: 42.8 % (ref 18–48)
MCH RBC QN AUTO: 26.6 PG (ref 27–31)
MCHC RBC AUTO-ENTMCNC: 32.7 G/DL (ref 32–36)
MCV RBC AUTO: 81 FL (ref 82–98)
MONOCYTES # BLD AUTO: 0.8 K/UL (ref 0.3–1)
MONOCYTES NFR BLD: 13.8 % (ref 4–15)
NEUTROPHILS # BLD AUTO: 2.2 K/UL (ref 1.8–7.7)
NEUTROPHILS NFR BLD: 37.3 % (ref 38–73)
NRBC BLD-RTO: 0 /100 WBC
PLATELET # BLD AUTO: 238 K/UL (ref 150–450)
PMV BLD AUTO: 10.1 FL (ref 9.2–12.9)
POTASSIUM SERPL-SCNC: 4 MMOL/L (ref 3.5–5.1)
PROT SERPL-MCNC: 7.2 G/DL (ref 6–8.4)
RBC # BLD AUTO: 5.48 M/UL (ref 4–5.4)
SARS-COV-2 RDRP RESP QL NAA+PROBE: NEGATIVE
SODIUM SERPL-SCNC: 141 MMOL/L (ref 136–145)
SPECIMEN SOURCE: NORMAL
TROPONIN I SERPL DL<=0.01 NG/ML-MCNC: <0.006 NG/ML (ref 0–0.03)
WBC # BLD AUTO: 5.87 K/UL (ref 3.9–12.7)

## 2024-07-28 PROCEDURE — 93010 ELECTROCARDIOGRAM REPORT: CPT | Mod: ,,, | Performed by: INTERNAL MEDICINE

## 2024-07-28 PROCEDURE — 80053 COMPREHEN METABOLIC PANEL: CPT | Performed by: STUDENT IN AN ORGANIZED HEALTH CARE EDUCATION/TRAINING PROGRAM

## 2024-07-28 PROCEDURE — 87651 STREP A DNA AMP PROBE: CPT | Performed by: STUDENT IN AN ORGANIZED HEALTH CARE EDUCATION/TRAINING PROGRAM

## 2024-07-28 PROCEDURE — 87502 INFLUENZA DNA AMP PROBE: CPT | Performed by: STUDENT IN AN ORGANIZED HEALTH CARE EDUCATION/TRAINING PROGRAM

## 2024-07-28 PROCEDURE — 93005 ELECTROCARDIOGRAM TRACING: CPT

## 2024-07-28 PROCEDURE — 99285 EMERGENCY DEPT VISIT HI MDM: CPT | Mod: 25

## 2024-07-28 PROCEDURE — U0002 COVID-19 LAB TEST NON-CDC: HCPCS | Performed by: STUDENT IN AN ORGANIZED HEALTH CARE EDUCATION/TRAINING PROGRAM

## 2024-07-28 PROCEDURE — 85025 COMPLETE CBC W/AUTO DIFF WBC: CPT | Performed by: STUDENT IN AN ORGANIZED HEALTH CARE EDUCATION/TRAINING PROGRAM

## 2024-07-28 PROCEDURE — 84484 ASSAY OF TROPONIN QUANT: CPT | Performed by: STUDENT IN AN ORGANIZED HEALTH CARE EDUCATION/TRAINING PROGRAM

## 2024-07-28 PROCEDURE — 85379 FIBRIN DEGRADATION QUANT: CPT | Performed by: STUDENT IN AN ORGANIZED HEALTH CARE EDUCATION/TRAINING PROGRAM

## 2024-07-28 PROCEDURE — 99900035 HC TECH TIME PER 15 MIN (STAT)

## 2024-07-29 VITALS
BODY MASS INDEX: 43.36 KG/M2 | WEIGHT: 254 LBS | RESPIRATION RATE: 18 BRPM | TEMPERATURE: 98 F | HEIGHT: 64 IN | SYSTOLIC BLOOD PRESSURE: 135 MMHG | DIASTOLIC BLOOD PRESSURE: 65 MMHG | OXYGEN SATURATION: 96 % | HEART RATE: 57 BPM

## 2024-07-29 PROCEDURE — 25500020 PHARM REV CODE 255: Performed by: STUDENT IN AN ORGANIZED HEALTH CARE EDUCATION/TRAINING PROGRAM

## 2024-07-29 RX ORDER — ALBUTEROL SULFATE 90 UG/1
2 AEROSOL, METERED RESPIRATORY (INHALATION) EVERY 6 HOURS PRN
Qty: 18 G | Refills: 0 | Status: SHIPPED | OUTPATIENT
Start: 2024-07-29 | End: 2025-07-29

## 2024-07-29 RX ORDER — OMEPRAZOLE 20 MG/1
20 CAPSULE, DELAYED RELEASE ORAL DAILY
Qty: 90 CAPSULE | Refills: 3 | Status: SHIPPED | OUTPATIENT
Start: 2024-07-29 | End: 2025-07-29

## 2024-07-29 RX ORDER — FAMOTIDINE 20 MG/1
20 TABLET, FILM COATED ORAL 2 TIMES DAILY
Qty: 20 TABLET | Refills: 0 | Status: SHIPPED | OUTPATIENT
Start: 2024-07-29 | End: 2025-07-29

## 2024-07-29 RX ORDER — AZITHROMYCIN 250 MG/1
TABLET, FILM COATED ORAL
Qty: 6 TABLET | Refills: 0 | Status: SHIPPED | OUTPATIENT
Start: 2024-07-29 | End: 2024-08-03

## 2024-07-29 RX ORDER — MONTELUKAST SODIUM 10 MG/1
10 TABLET ORAL NIGHTLY
Qty: 30 TABLET | Refills: 0 | Status: SHIPPED | OUTPATIENT
Start: 2024-07-29 | End: 2024-08-28

## 2024-07-29 RX ORDER — DEXAMETHASONE 4 MG/1
16 TABLET ORAL ONCE
Qty: 4 TABLET | Refills: 0 | Status: SHIPPED | OUTPATIENT
Start: 2024-07-29 | End: 2024-07-29

## 2024-07-29 RX ADMIN — IOHEXOL 100 ML: 350 INJECTION, SOLUTION INTRAVENOUS at 12:07

## 2024-07-29 NOTE — ED PROVIDER NOTES
"Encounter Date: 7/28/2024    History     Chief Complaint   Patient presents with    Fever    Chills    Chest Pain     Patient reports fever, chills since Tuesday.  Productive cough with green mucous.  Also complains of chest pain that worsens with cough.  Tylenol last taken at 1800.  States her right leg has been staying hot.     HPI    63 y.o. female PMHx bronchitis, gout, rheumatoid arthritis, presenting to ED with 5 days green productive sputum with cough and reproducible chest wall tenderness with coughing. No palpitations, SOB, abdominal pain, n/v/d/c. Has had a subjective fever. No HA, vision changes, numbness/tingling/focal weakness to extremities, lightheadedness, dizziness, neck pain, calf pain, swelling in extremities, dysuria, hematuria, rashes, syncope. No recent sick contacts, travel, surgeries or hospitalizations.    10 point review of systems is negative except as per HPI.    Review of patient's allergies indicates:   Allergen Reactions    Lortab [hydrocodone-acetaminophen] Itching     STATES "CAN'T TAKE LORTAB 5 MG,BUT CAN TAKE LORTAB 10 MG     Past Medical History:   Diagnosis Date    Back pain     Bronchitis     Contact dermatitis     Corns and callosities     Gout     Lymphedema of both lower extremities     Morbid obesity     RA (rheumatoid arthritis)     Tinea pedis of right foot      Past Surgical History:   Procedure Laterality Date    CARPAL TUNNEL RELEASE  2009    left    COLONOSCOPY N/A 10/20/2020    Procedure: COLONOSCOPY;  Surgeon: Luis A Luu MD;  Location: [facility];  Service: General;  Laterality: N/A;    HYSTERECTOMY      JOINT REPLACEMENT      partial hysterctomy  2010    SHOULDER SURGERY      TOTAL KNEE ARTHROPLASTY  4/4/11    left    TUBAL LIGATION       Family History   Problem Relation Name Age of Onset    Cancer Mother          Breast (?cured), then liver and bone    Hypertension Mother      Breast cancer Mother      Hypertension Father      Stroke Father      Colon cancer " Father      Hypertension Sister      Hypertension Brother      Diabetes Neg Hx       Social History     Tobacco Use    Smoking status: Every Day     Current packs/day: 1.00     Average packs/day: 1 pack/day for 30.0 years (30.0 ttl pk-yrs)     Types: Cigarettes    Smokeless tobacco: Never   Substance Use Topics    Alcohol use: Yes     Comment: occasional light drinker    Drug use: No     Review of Systems   All other systems reviewed and are negative.      Physical Exam     Initial Vitals [07/28/24 2206]   BP Pulse Resp Temp SpO2   (!) 146/76 70 18 98.4 °F (36.9 °C) 99 %      MAP       --         Physical Exam    Nursing note and vitals reviewed.  Constitutional: She appears well-developed and well-nourished. She is not diaphoretic. No distress.   Speaking in full and clear sentences without issues, no acute distress, follows commands.   HENT:   Head: Normocephalic and atraumatic.   Eyes: Conjunctivae and EOM are normal. Pupils are equal, round, and reactive to light.   Neck: Neck supple. No tracheal deviation present.   Normal range of motion.  Cardiovascular:  Normal rate, regular rhythm and normal heart sounds.           Pulmonary/Chest: Breath sounds normal. No respiratory distress. She has no wheezes. She has no rhonchi. She has no rales.   Musculoskeletal:         General: No tenderness or edema. Normal range of motion.      Cervical back: Normal range of motion and neck supple.     Lymphadenopathy:     She has no cervical adenopathy.   Neurological: She is alert.   Skin: Skin is warm and dry. Capillary refill takes less than 2 seconds.           ED Course     63 y.o. female with hx stated above, presenting with 5 days green productive sputum with cough and reproducible chest wall tenderness with coughing. No palpitations, SOB, abdominal pain, n/v/d/c. Has had a subjective fever. No HA, vision changes, numbness/tingling/focal weakness to extremities, lightheadedness, dizziness, neck pain, calf pain, swelling  in extremities, dysuria, hematuria, rashes, syncope. No recent sick contacts, travel, surgeries or hospitalizations.    VS wnl. Physical exam findings as noted above.    Plan:  Triage labs obtained including CBC, BMP, LFTs, trop and D-Dimer.    EKG by my interpretation with normal MN segment, QRS complex, QT interval, ST segment, P wave and T wave morphology. EKG w/o sign of HOCM, Brugada, WPW, short/long QT, AV block, bradycardia, Sick Sinus Syndrome, V-tach, V-fib, right heart strain, ARVD. No significant ST elevations, depressions, TWI, Q-waves, tall/hyperacute T-waves, no evidence of acute ischemia.    11:00 PM  Labs with no leukocytosis, no anemia. BMP unremarkable with no VIOLA, no electrolyte abnormalities. Blood sugar wnl. No transaminitis. Troponin negative for cardiac ischemia. D-Dimer elevated at 1.22. Obtain CTA chest r/o PE.    1:00 AM  CTA chest shows no PE.    Patient reassessed. Speaking in full and clear sentences with no difficulty. Ambulating well, steady gait. VS reassessed, within normal limits. Patient cleared for discharge.    Laboratory and imaging studies obtained are overall unremarkable for acute emergent conditions. At this time, I do not identify any acute life threatening emergent, surgical or systemic infectious etiology relating to this presentation. Patient is currently stable and appropriate for outpatient disposition. I have discussed evaluation, findings and treatment provided. No additional interventions are required at this time. Patient is in agreement with this outpatient plan and discharged accordingly to follow up with PMD as discussed. Patient instructed to return to ED if continued, worsening or newly developed symptoms occur.      Clinical Impression:  Bronchitis  Chest pain    DISCLAIMER:   Inadvertent spelling and per medical areas are likely due to EMR/dictation software use and do not reflect on the overall quality of the patient care. Note that the electronic time  recorded on this note does not necessarily reflect the actual time of the patient encounter.          Luis Zaldivar MD  07/30/24 0523

## 2024-07-29 NOTE — TELEPHONE ENCOUNTER
Pt states that Tuesday she had shivering, fever and sore throat. Pt states that s/s are better. No c/o right upper leg feeling hot with pain 8/10. Pt also c/o some chest pain. Pt states that she still has fever, unable to check temp due to no thermometer. Advised to go to ED now per protocol. Also advised to call 911 if immediate transportation is unavailable. VU. Encounter routed to provider.     Reason for Disposition   Chest pain    Additional Information   Negative: Looks like a broken bone or dislocated joint (e.g., crooked or deformed)   Negative: Sounds like a life-threatening emergency to the triager    Protocols used: Leg Pain-A-AH

## 2024-07-29 NOTE — DISCHARGE INSTRUCTIONS
Please return to the ED if any worsening cough and shortness of breath palpitations, chest pain, shortness of breath, symptoms of abdominal pain, fever, headache, vision changes, focal weakness to extremities, lightheadedness, dizziness, loss of consciousness, rashes, seizures. Follow up with your primary doctor.

## 2024-07-30 LAB
OHS QRS DURATION: 76 MS
OHS QTC CALCULATION: 406 MS

## 2025-02-15 ENCOUNTER — HOSPITAL ENCOUNTER (EMERGENCY)
Facility: HOSPITAL | Age: 65
Discharge: HOME | End: 2025-02-16
Attending: STUDENT IN AN ORGANIZED HEALTH CARE EDUCATION/TRAINING PROGRAM
Payer: MEDICARE

## 2025-02-15 DIAGNOSIS — K57.92 DIVERTICULITIS: ICD-10-CM

## 2025-02-15 DIAGNOSIS — R10.84 GENERALIZED ABDOMINAL PAIN: Primary | ICD-10-CM

## 2025-02-15 DIAGNOSIS — S90.852A FOREIGN BODY IN LEFT FOOT: ICD-10-CM

## 2025-02-15 PROCEDURE — 99285 EMERGENCY DEPT VISIT HI MDM: CPT | Mod: 25

## 2025-02-16 VITALS
WEIGHT: 256 LBS | DIASTOLIC BLOOD PRESSURE: 60 MMHG | BODY MASS INDEX: 43.71 KG/M2 | HEIGHT: 64 IN | TEMPERATURE: 100 F | OXYGEN SATURATION: 97 % | HEART RATE: 81 BPM | RESPIRATION RATE: 27 BRPM | SYSTOLIC BLOOD PRESSURE: 132 MMHG

## 2025-02-16 LAB
ALBUMIN SERPL BCP-MCNC: 3.8 G/DL (ref 3.5–5.2)
ALP SERPL-CCNC: 69 U/L (ref 40–150)
ALT SERPL W/O P-5'-P-CCNC: 11 U/L (ref 10–44)
ANION GAP SERPL CALC-SCNC: 10 MMOL/L (ref 8–16)
AST SERPL-CCNC: 15 U/L (ref 10–40)
BASOPHILS # BLD AUTO: 0.02 K/UL (ref 0–0.2)
BASOPHILS NFR BLD: 0.2 % (ref 0–1.9)
BILIRUB SERPL-MCNC: 1.1 MG/DL (ref 0.1–1)
BILIRUB UR QL STRIP: NEGATIVE
BUN SERPL-MCNC: 12 MG/DL (ref 8–23)
CALCIUM SERPL-MCNC: 9.7 MG/DL (ref 8.7–10.5)
CHLORIDE SERPL-SCNC: 103 MMOL/L (ref 95–110)
CLARITY UR: CLEAR
CO2 SERPL-SCNC: 25 MMOL/L (ref 23–29)
COLOR UR: YELLOW
CREAT SERPL-MCNC: 0.9 MG/DL (ref 0.5–1.4)
DIFFERENTIAL METHOD BLD: ABNORMAL
EOSINOPHIL # BLD AUTO: 0.1 K/UL (ref 0–0.5)
EOSINOPHIL NFR BLD: 1.2 % (ref 0–8)
ERYTHROCYTE [DISTWIDTH] IN BLOOD BY AUTOMATED COUNT: 15.6 % (ref 11.5–14.5)
EST. GFR  (NO RACE VARIABLE): >60 ML/MIN/1.73 M^2
GLUCOSE SERPL-MCNC: 104 MG/DL (ref 70–110)
GLUCOSE UR QL STRIP: NEGATIVE
HCT VFR BLD AUTO: 44.9 % (ref 37–48.5)
HGB BLD-MCNC: 14.6 G/DL (ref 12–16)
HGB UR QL STRIP: NEGATIVE
IMM GRANULOCYTES # BLD AUTO: 0.03 K/UL (ref 0–0.04)
IMM GRANULOCYTES NFR BLD AUTO: 0.3 % (ref 0–0.5)
INFLUENZA A, MOLECULAR: NEGATIVE
INFLUENZA B, MOLECULAR: NEGATIVE
KETONES UR QL STRIP: NEGATIVE
LEUKOCYTE ESTERASE UR QL STRIP: NEGATIVE
LIPASE SERPL-CCNC: 3 U/L (ref 4–60)
LYMPHOCYTES # BLD AUTO: 2.3 K/UL (ref 1–4.8)
LYMPHOCYTES NFR BLD: 22.2 % (ref 18–48)
MCH RBC QN AUTO: 26.2 PG (ref 27–31)
MCHC RBC AUTO-ENTMCNC: 32.5 G/DL (ref 32–36)
MCV RBC AUTO: 81 FL (ref 82–98)
MONOCYTES # BLD AUTO: 0.9 K/UL (ref 0.3–1)
MONOCYTES NFR BLD: 8.8 % (ref 4–15)
NEUTROPHILS # BLD AUTO: 6.9 K/UL (ref 1.8–7.7)
NEUTROPHILS NFR BLD: 67.3 % (ref 38–73)
NITRITE UR QL STRIP: NEGATIVE
NRBC BLD-RTO: 0 /100 WBC
PH UR STRIP: 6 [PH] (ref 5–8)
PLATELET # BLD AUTO: 239 K/UL (ref 150–450)
PMV BLD AUTO: 10.5 FL (ref 9.2–12.9)
POTASSIUM SERPL-SCNC: 4 MMOL/L (ref 3.5–5.1)
PROT SERPL-MCNC: 7.9 G/DL (ref 6–8.4)
PROT UR QL STRIP: NEGATIVE
RBC # BLD AUTO: 5.57 M/UL (ref 4–5.4)
SARS-COV-2 RDRP RESP QL NAA+PROBE: NEGATIVE
SODIUM SERPL-SCNC: 138 MMOL/L (ref 136–145)
SP GR UR STRIP: 1.02 (ref 1–1.03)
SPECIMEN SOURCE: NORMAL
URN SPEC COLLECT METH UR: NORMAL
UROBILINOGEN UR STRIP-ACNC: NEGATIVE EU/DL
WBC # BLD AUTO: 10.21 K/UL (ref 3.9–12.7)

## 2025-02-16 PROCEDURE — 63600175 PHARM REV CODE 636 W HCPCS: Performed by: STUDENT IN AN ORGANIZED HEALTH CARE EDUCATION/TRAINING PROGRAM

## 2025-02-16 PROCEDURE — 25000003 PHARM REV CODE 250: Performed by: STUDENT IN AN ORGANIZED HEALTH CARE EDUCATION/TRAINING PROGRAM

## 2025-02-16 PROCEDURE — 96375 TX/PRO/DX INJ NEW DRUG ADDON: CPT

## 2025-02-16 PROCEDURE — 80053 COMPREHEN METABOLIC PANEL: CPT | Performed by: STUDENT IN AN ORGANIZED HEALTH CARE EDUCATION/TRAINING PROGRAM

## 2025-02-16 PROCEDURE — 85025 COMPLETE CBC W/AUTO DIFF WBC: CPT | Performed by: STUDENT IN AN ORGANIZED HEALTH CARE EDUCATION/TRAINING PROGRAM

## 2025-02-16 PROCEDURE — 96365 THER/PROPH/DIAG IV INF INIT: CPT

## 2025-02-16 PROCEDURE — 81003 URINALYSIS AUTO W/O SCOPE: CPT | Performed by: STUDENT IN AN ORGANIZED HEALTH CARE EDUCATION/TRAINING PROGRAM

## 2025-02-16 PROCEDURE — 87502 INFLUENZA DNA AMP PROBE: CPT | Performed by: STUDENT IN AN ORGANIZED HEALTH CARE EDUCATION/TRAINING PROGRAM

## 2025-02-16 PROCEDURE — 83690 ASSAY OF LIPASE: CPT | Performed by: STUDENT IN AN ORGANIZED HEALTH CARE EDUCATION/TRAINING PROGRAM

## 2025-02-16 PROCEDURE — 25500020 PHARM REV CODE 255: Performed by: STUDENT IN AN ORGANIZED HEALTH CARE EDUCATION/TRAINING PROGRAM

## 2025-02-16 PROCEDURE — 87635 SARS-COV-2 COVID-19 AMP PRB: CPT | Performed by: STUDENT IN AN ORGANIZED HEALTH CARE EDUCATION/TRAINING PROGRAM

## 2025-02-16 RX ORDER — METRONIDAZOLE 500 MG/1
500 TABLET ORAL
Status: COMPLETED | OUTPATIENT
Start: 2025-02-16 | End: 2025-02-16

## 2025-02-16 RX ORDER — CIPROFLOXACIN 2 MG/ML
400 INJECTION, SOLUTION INTRAVENOUS
Status: COMPLETED | OUTPATIENT
Start: 2025-02-16 | End: 2025-02-16

## 2025-02-16 RX ORDER — CIPROFLOXACIN 500 MG/1
500 TABLET ORAL 2 TIMES DAILY
Qty: 20 TABLET | Refills: 0 | Status: SHIPPED | OUTPATIENT
Start: 2025-02-16 | End: 2025-02-26

## 2025-02-16 RX ORDER — METRONIDAZOLE 500 MG/1
500 TABLET ORAL 3 TIMES DAILY
Qty: 21 TABLET | Refills: 0 | Status: SHIPPED | OUTPATIENT
Start: 2025-02-16 | End: 2025-02-23

## 2025-02-16 RX ORDER — ACETAMINOPHEN 325 MG/1
650 TABLET ORAL
Status: COMPLETED | OUTPATIENT
Start: 2025-02-16 | End: 2025-02-16

## 2025-02-16 RX ORDER — KETOROLAC TROMETHAMINE 30 MG/ML
15 INJECTION, SOLUTION INTRAMUSCULAR; INTRAVENOUS
Status: COMPLETED | OUTPATIENT
Start: 2025-02-16 | End: 2025-02-16

## 2025-02-16 RX ADMIN — CIPROFLOXACIN 400 MG: 2 INJECTION, SOLUTION INTRAVENOUS at 01:02

## 2025-02-16 RX ADMIN — METRONIDAZOLE 500 MG: 500 TABLET ORAL at 01:02

## 2025-02-16 RX ADMIN — IOHEXOL 100 ML: 350 INJECTION, SOLUTION INTRAVENOUS at 01:02

## 2025-02-16 RX ADMIN — ACETAMINOPHEN 650 MG: 325 TABLET ORAL at 12:02

## 2025-02-16 RX ADMIN — KETOROLAC TROMETHAMINE 15 MG: 30 INJECTION, SOLUTION INTRAMUSCULAR at 12:02

## 2025-02-16 NOTE — ED PROVIDER NOTES
"Encounter Date: 2/15/2025       History     Chief Complaint   Patient presents with    Abdominal Pain     Pt to ED with c/o abdominal pain, lower back pain, cough and piece of glass in left foot.      64-year-old female with history of lymphedema of bilateral lower extremities, bronchitis, rheumatoid arthritis, presenting with centralized abdominal pain that began two days ago.  Denies nausea, vomiting, diarrhea.  No fever.  Patient does report cough and body aches.  No chest pain or shortness breath.  Patient also reports that one week ago she stepped on a small piece of glass in her kitchen, and then it has been stuck in her left foot with continuing pain.  No other complaints.      Review of patient's allergies indicates:   Allergen Reactions    Lortab [hydrocodone-acetaminophen] Itching     STATES "CAN'T TAKE LORTAB 5 MG,BUT CAN TAKE LORTAB 10 MG     Past Medical History:   Diagnosis Date    Back pain     Bronchitis     Contact dermatitis     Corns and callosities     Gout     Lymphedema of both lower extremities     Morbid obesity     RA (rheumatoid arthritis)     Tinea pedis of right foot      Past Surgical History:   Procedure Laterality Date    CARPAL TUNNEL RELEASE  2009    left    COLONOSCOPY N/A 10/20/2020    Procedure: COLONOSCOPY;  Surgeon: Luis A Luu MD;  Location: [facility];  Service: General;  Laterality: N/A;    HYSTERECTOMY      JOINT REPLACEMENT      partial hysterctomy  2010    SHOULDER SURGERY      TOTAL KNEE ARTHROPLASTY  4/4/11    left    TUBAL LIGATION       Family History   Problem Relation Name Age of Onset    Cancer Mother          Breast (?cured), then liver and bone    Hypertension Mother      Breast cancer Mother      Hypertension Father      Stroke Father      Colon cancer Father      Hypertension Sister      Hypertension Brother      Diabetes Neg Hx       Social History[1]  Review of Systems   Constitutional:  Negative for fever.   HENT:  Negative for congestion and sore throat.  "   Respiratory:  Negative for cough and shortness of breath.    Cardiovascular:  Negative for chest pain.   Gastrointestinal:  Positive for abdominal pain. Negative for diarrhea, nausea and vomiting.   Genitourinary:  Negative for dysuria.   Musculoskeletal:  Negative for back pain.        L foot pain   Skin:  Negative for rash.   Neurological:  Negative for weakness.   Hematological:  Does not bruise/bleed easily.       Physical Exam     Initial Vitals [02/15/25 2337]   BP Pulse Resp Temp SpO2   139/65 89 18 (!) 100.4 °F (38 °C) 99 %      MAP       --         Physical Exam    Nursing note and vitals reviewed.  Constitutional: She appears well-developed. She is not diaphoretic. No distress.   HENT:   Head: Normocephalic.   Eyes: Pupils are equal, round, and reactive to light.   Neck:   Normal range of motion.  Cardiovascular:            No murmur heard.  Pulmonary/Chest: No respiratory distress.   Clear lungs bilaterally.  No respiratory distress.  No wheezing or rales.  Good air movement.     Abdominal: Abdomen is soft.   Generalized abdominal tenderness to palpation.  No rebound or guarding. No CVAT bilaterally.    Musculoskeletal:         General: No edema.      Cervical back: Normal range of motion.     Neurological: She is alert.   Skin: Skin is warm.   Psychiatric: She has a normal mood and affect.         ED Course   Procedures  Labs Reviewed   INFLUENZA A & B BY MOLECULAR   SARS-COV-2 RNA AMPLIFICATION, QUAL   URINALYSIS, REFLEX TO URINE CULTURE   CBC W/ AUTO DIFFERENTIAL   COMPREHENSIVE METABOLIC PANEL   LIPASE          Imaging Results    None          Medications - No data to display  Medical Decision Making  DDX: Diverticulitis vs. kidney stone vs appendicitis this has pancreatitis versus biliary pathology versus urinary tract infection. Otherwise likely gastroenteritis/nonspecific abdominal pain.  Will image to rule out foreign body in the foot, and if required will remove.  DX: BMP, CBC, LFT, Lipase.  CTAP. UA/Udip.  TX: Analgesia, antiemetic PRN. IVF if vomiting. Treatment/consult as indicated by studies.  Dispo: Pending studies. If studies WNL, symptoms controlled, or findings stable for outpatient management, discharge to follow up with PMD +/- specialist within 3 days with supportive care recommendations and strict precautions for return.        Amount and/or Complexity of Data Reviewed  Labs: ordered.  Radiology: ordered.                                      Clinical Impression:  Final diagnoses:  [S90.852A] Foreign body in left foot  [R10.84] Generalized abdominal pain (Primary)                   [1]   Social History  Tobacco Use    Smoking status: Every Day     Current packs/day: 1.00     Average packs/day: 1 pack/day for 30.0 years (30.0 ttl pk-yrs)     Types: Cigarettes    Smokeless tobacco: Never   Substance Use Topics    Alcohol use: Yes     Comment: occasional light drinker    Drug use: No        Eric Schaffer MD  02/16/25 0010

## 2025-04-11 ENCOUNTER — HOSPITAL ENCOUNTER (EMERGENCY)
Facility: HOSPITAL | Age: 65
Discharge: HOME | End: 2025-04-12
Attending: FAMILY MEDICINE
Payer: MEDICARE

## 2025-04-11 DIAGNOSIS — B34.9 VIRAL SYNDROME: Primary | ICD-10-CM

## 2025-04-11 DIAGNOSIS — A08.4 VIRAL GASTROENTERITIS: ICD-10-CM

## 2025-04-11 LAB
GROUP A STREP MOLECULAR (OHS): NEGATIVE
INFLUENZA A MOLECULAR (OHS): NEGATIVE
INFLUENZA B MOLECULAR (OHS): NEGATIVE
SARS-COV-2 RDRP RESP QL NAA+PROBE: NEGATIVE

## 2025-04-11 PROCEDURE — 87502 INFLUENZA DNA AMP PROBE: CPT | Performed by: FAMILY MEDICINE

## 2025-04-11 PROCEDURE — U0002 COVID-19 LAB TEST NON-CDC: HCPCS | Performed by: FAMILY MEDICINE

## 2025-04-11 PROCEDURE — 25000003 PHARM REV CODE 250: Performed by: FAMILY MEDICINE

## 2025-04-11 PROCEDURE — 99283 EMERGENCY DEPT VISIT LOW MDM: CPT

## 2025-04-11 PROCEDURE — 87651 STREP A DNA AMP PROBE: CPT | Performed by: FAMILY MEDICINE

## 2025-04-11 RX ORDER — ACETAMINOPHEN 500 MG
1000 TABLET ORAL
Status: COMPLETED | OUTPATIENT
Start: 2025-04-11 | End: 2025-04-11

## 2025-04-11 RX ORDER — ONDANSETRON 4 MG/1
4 TABLET, ORALLY DISINTEGRATING ORAL
Status: COMPLETED | OUTPATIENT
Start: 2025-04-11 | End: 2025-04-11

## 2025-04-11 RX ADMIN — ONDANSETRON 4 MG: 4 TABLET, ORALLY DISINTEGRATING ORAL at 11:04

## 2025-04-11 RX ADMIN — ACETAMINOPHEN 1000 MG: 500 TABLET ORAL at 11:04

## 2025-04-12 VITALS
HEIGHT: 64 IN | WEIGHT: 252.44 LBS | OXYGEN SATURATION: 95 % | DIASTOLIC BLOOD PRESSURE: 82 MMHG | BODY MASS INDEX: 43.1 KG/M2 | RESPIRATION RATE: 19 BRPM | SYSTOLIC BLOOD PRESSURE: 115 MMHG | HEART RATE: 99 BPM | TEMPERATURE: 99 F

## 2025-04-12 LAB
BACTERIA #/AREA URNS HPF: ABNORMAL /HPF
BILIRUB UR QL STRIP.AUTO: NEGATIVE
CLARITY UR: CLEAR
COLOR UR AUTO: YELLOW
GLUCOSE UR QL STRIP: NEGATIVE
HGB UR QL STRIP: NEGATIVE
HYALINE CASTS #/AREA URNS LPF: 5 /LPF (ref 0–1)
KETONES UR QL STRIP: NEGATIVE
LEUKOCYTE ESTERASE UR QL STRIP: NEGATIVE
MICROSCOPIC COMMENT: ABNORMAL
NITRITE UR QL STRIP: NEGATIVE
PH UR STRIP: 8 [PH]
PROT UR QL STRIP: ABNORMAL
RBC #/AREA URNS HPF: 2 /HPF (ref 0–4)
SP GR UR STRIP: 1.02
SQUAMOUS #/AREA URNS HPF: 7 /HPF
UROBILINOGEN UR STRIP-ACNC: NEGATIVE EU/DL
WBC #/AREA URNS HPF: 1 /HPF (ref 0–5)

## 2025-04-12 PROCEDURE — 81003 URINALYSIS AUTO W/O SCOPE: CPT | Performed by: FAMILY MEDICINE

## 2025-04-12 RX ORDER — ONDANSETRON 4 MG/1
4 TABLET, FILM COATED ORAL EVERY 8 HOURS PRN
Qty: 12 TABLET | Refills: 0 | Status: SHIPPED | OUTPATIENT
Start: 2025-04-12

## 2025-04-16 NOTE — ED PROVIDER NOTES
"Encounter Date: 4/11/2025       History     Chief Complaint   Patient presents with    General Illness     Pt to ED with c/o cough, congestion and nausea/vomiting that began today. Reports chills throughout the day.      HPI  64-year-old female with a history of rheumatoid arthritis presents to the ED with cough, congestion and nausea vomiting that started 3 hours prior to presentation.  She does report chills.  No chest pain, shortness of breath.  No known sick contacts.  Review of patient's allergies indicates:   Allergen Reactions    Lortab [hydrocodone-acetaminophen] Itching     STATES "CAN'T TAKE LORTAB 5 MG,BUT CAN TAKE LORTAB 10 MG     Past Medical History:   Diagnosis Date    Back pain     Bronchitis     Contact dermatitis     Corns and callosities     Gout     Lymphedema of both lower extremities     Morbid obesity     RA (rheumatoid arthritis)     Tinea pedis of right foot      Past Surgical History:   Procedure Laterality Date    CARPAL TUNNEL RELEASE  2009    left    COLONOSCOPY N/A 10/20/2020    Procedure: COLONOSCOPY;  Surgeon: Luis A Luu MD;  Location: [facility];  Service: General;  Laterality: N/A;    HYSTERECTOMY      JOINT REPLACEMENT      partial hysterctomy  2010    SHOULDER SURGERY      TOTAL KNEE ARTHROPLASTY  4/4/11    left    TUBAL LIGATION       Family History   Problem Relation Name Age of Onset    Cancer Mother          Breast (?cured), then liver and bone    Hypertension Mother      Breast cancer Mother      Hypertension Father      Stroke Father      Colon cancer Father      Hypertension Sister      Hypertension Brother      Diabetes Neg Hx       Social History[1]  Review of Systems   Constitutional:  Positive for chills. Negative for fever.   HENT:  Positive for congestion.    Eyes:  Negative for visual disturbance.   Respiratory:  Positive for cough.    Gastrointestinal:  Positive for nausea and vomiting.   Genitourinary:  Negative for dysuria.   Skin:  Negative for rash. "   Neurological:  Negative for headaches.   All other systems reviewed and are negative.      Physical Exam     Initial Vitals [04/11/25 2333]   BP Pulse Resp Temp SpO2   115/82 99 19 100.2 °F (37.9 °C) 95 %      MAP       --         Physical Exam    Constitutional: She appears well-developed and well-nourished. She is not diaphoretic. No distress.   HENT:   Head: Normocephalic and atraumatic.   Right Ear: External ear normal.   Left Ear: External ear normal.   Eyes: EOM are normal. Pupils are equal, round, and reactive to light.   Neck:   Normal range of motion.  Cardiovascular:  Normal rate and regular rhythm.           No murmur heard.  Pulmonary/Chest: Breath sounds normal. No respiratory distress. She has no wheezes.   Abdominal: Abdomen is soft. She exhibits no distension. There is no abdominal tenderness.   Musculoskeletal:         General: Normal range of motion.      Cervical back: Normal range of motion.     Neurological: She is alert and oriented to person, place, and time. GCS score is 15. GCS eye subscore is 4. GCS verbal subscore is 5. GCS motor subscore is 6.   Skin: Skin is warm and dry. No rash noted.   Psychiatric: She has a normal mood and affect.         ED Course   Procedures  Labs Reviewed   URINALYSIS, REFLEX TO URINE CULTURE - Abnormal       Result Value    Color, UA Yellow      Appearance, UA Clear      pH, UA 8.0      Spec Grav UA 1.020      Protein, UA 1+ (*)     Glucose, UA Negative      Ketones, UA Negative      Bilirubin, UA Negative      Blood, UA Negative      Nitrites, UA Negative      Urobilinogen, UA Negative      Leukocyte Esterase, UA Negative     URINALYSIS MICROSCOPIC - Abnormal    RBC, UA 2      WBC, UA 1      Bacteria, UA Occasional      Squamous Epithelial Cells, UA 7      Hyaline Casts, UA 5 (*)     Microscopic Comment Mucus present     INFLUENZA A & B BY MOLECULAR - Normal    INFLUENZA A MOLECULAR Negative      INFLUENZA B MOLECULAR  Negative     GROUP A STREP, MOLECULAR -  Normal    Group A Strep Molecular Negative      Narrative:     Arcanobacterium haemolyticum and Beta Streptococcus group C and G will not be detected by this test method.  Please order Throat Culture ([id number]) if suspected.       SARS-COV-2 RNA AMPLIFICATION, QUAL - Normal    SARS COV-2 Molecular Negative     GREY TOP URINE HOLD          Imaging Results    None          Medications   acetaminophen tablet 1,000 mg (1,000 mg Oral Given 4/11/25 2340)   ondansetron disintegrating tablet 4 mg (4 mg Oral Given 4/11/25 2341)     Medical Decision Making  Differential diagnosis:  UTI, viral illness, gastroenteritis    64-year-old female that presents to the ED with nausea, vomiting x1, cough, congestion.  Patient states symptoms started prior to presentation.  Influenza, COVID negative.  Urinalysis does not demonstrate UTI.  Patient will be discharged home with Zofran.  Tolerating p.o. while in the ED.  All questions answered prior to discharge home.    Risk  OTC drugs.  Prescription drug management.                                      Clinical Impression:  Final diagnoses:  [B34.9] Viral syndrome (Primary)  [A08.4] Viral gastroenteritis          ED Disposition Condition    Discharge Stable          ED Prescriptions       Medication Sig Dispense Start Date End Date Auth. Provider    ondansetron (ZOFRAN) 4 MG tablet Take 1 tablet (4 mg total) by mouth every 8 (eight) hours as needed for Nausea. 12 tablet 4/12/2025 -- Chlesea Thomas MD          Follow-up Information       Follow up With Specialties Details Why Contact Info    Carla Pemberton MD Internal Medicine Schedule an appointment as soon as possible for a visit in 2 days  [address] [address]  [phone]                 [1]   Social History  Tobacco Use    Smoking status: Every Day     Current packs/day: 1.00     Average packs/day: 1 pack/day for 30.0 years (30.0 ttl pk-yrs)     Types: Cigarettes    Smokeless tobacco: Never   Substance Use Topics     Alcohol use: Yes     Comment: occasional light drinker    Drug use: No        Chelsea Thomas MD  04/16/25 0709

## 2025-06-14 ENCOUNTER — HOSPITAL ENCOUNTER (EMERGENCY)
Facility: HOSPITAL | Age: 65
Discharge: HOME | End: 2025-06-14
Attending: SURGERY
Payer: MEDICARE

## 2025-06-14 VITALS
HEART RATE: 65 BPM | RESPIRATION RATE: 19 BRPM | OXYGEN SATURATION: 98 % | BODY MASS INDEX: 43 KG/M2 | SYSTOLIC BLOOD PRESSURE: 131 MMHG | WEIGHT: 251.88 LBS | HEIGHT: 64 IN | TEMPERATURE: 99 F | DIASTOLIC BLOOD PRESSURE: 64 MMHG

## 2025-06-14 DIAGNOSIS — H10.9 CONJUNCTIVITIS OF LEFT EYE, UNSPECIFIED CONJUNCTIVITIS TYPE: Primary | ICD-10-CM

## 2025-06-14 PROCEDURE — 87651 STREP A DNA AMP PROBE: CPT | Performed by: SURGERY

## 2025-06-14 PROCEDURE — 87502 INFLUENZA DNA AMP PROBE: CPT | Performed by: SURGERY

## 2025-06-14 PROCEDURE — U0002 COVID-19 LAB TEST NON-CDC: HCPCS | Performed by: SURGERY

## 2025-06-14 PROCEDURE — 99283 EMERGENCY DEPT VISIT LOW MDM: CPT

## 2025-06-14 PROCEDURE — 25000003 PHARM REV CODE 250: Performed by: SURGERY

## 2025-06-14 RX ORDER — ERYTHROMYCIN 5 MG/G
OINTMENT OPHTHALMIC
Status: COMPLETED | OUTPATIENT
Start: 2025-06-14 | End: 2025-06-14

## 2025-06-14 RX ORDER — ERYTHROMYCIN 5 MG/G
OINTMENT OPHTHALMIC EVERY 6 HOURS
Qty: 7 G | Refills: 0 | Status: SHIPPED | OUTPATIENT
Start: 2025-06-14 | End: 2025-06-21

## 2025-06-14 RX ADMIN — ERYTHROMYCIN: 5 OINTMENT OPHTHALMIC at 10:06

## 2025-06-14 NOTE — ED TRIAGE NOTES
C/o rhinorrhea since Thursday and left eye soreness and redness since Tuesday. Patient reports was prescribed a Z pack and plans to start it today.

## 2025-06-14 NOTE — ED PROVIDER NOTES
"Encounter Date: 6/14/2025       History     Chief Complaint   Patient presents with    Eye Pain    Nasal Congestion     History of Present Illness  Christina Townsend is a 64 y.o. female that presents with eye redness  Patient presents with left eye redness & lid matting, cold symptoms today  Patient saw her primary care yesterday was prescribe Zithromax Z-Eder  Patient developed left eye redness & matting when she woke up this a.m.  Normal vision, no contact lenses, Good extraocular eye movement today    The history is provided by the patient.     Review of patient's allergies indicates:   Allergen Reactions    Lortab [hydrocodone-acetaminophen] Itching     STATES "CAN'T TAKE LORTAB 5 MG,BUT CAN TAKE LORTAB 10 MG     Past Medical History:   Diagnosis Date    Back pain     Bronchitis     Contact dermatitis     Corns and callosities     Gout     Lymphedema of both lower extremities     Morbid obesity     RA (rheumatoid arthritis)     Tinea pedis of right foot      Past Surgical History:   Procedure Laterality Date    CARPAL TUNNEL RELEASE  2009    left    COLONOSCOPY N/A 10/20/2020    Procedure: COLONOSCOPY;  Surgeon: Luis A Luu MD;  Location: [facility];  Service: General;  Laterality: N/A;    HYSTERECTOMY      JOINT REPLACEMENT      partial hysterctomy  2010    SHOULDER SURGERY      TOTAL KNEE ARTHROPLASTY  4/4/11    left    TUBAL LIGATION       Family History   Problem Relation Name Age of Onset    Cancer Mother          Breast (?cured), then liver and bone    Hypertension Mother      Breast cancer Mother      Hypertension Father      Stroke Father      Colon cancer Father      Hypertension Sister      Hypertension Brother      Diabetes Neg Hx       Social History[1]    Review of Systems   Constitutional: Negative.    HENT:  Positive for congestion.    Eyes:  Positive for redness.   Respiratory: Negative.     Cardiovascular: Negative.    Gastrointestinal: Negative.    Genitourinary: Negative.  "   Musculoskeletal: Negative.    Skin: Negative.    Neurological: Negative.    Psychiatric/Behavioral: Negative.         Physical Exam     Initial Vitals [06/14/25 1023]   BP Pulse Resp Temp SpO2   (!) 149/77 76 19 97.9 °F (36.6 °C) 97 %      MAP       --         Physical Exam    Nursing note and vitals reviewed.  Constitutional: Vital signs are normal. She appears well-developed and well-nourished. She is cooperative.   HENT:   Head: Normocephalic and atraumatic.   Eyes: Conjunctivae, EOM and lids are normal. Pupils are equal, round, and reactive to light.   (+) conjunctivitis on the left eye, no foreign body  (-) for ulceration, normal vision on evaluation   Neck: Trachea normal and phonation normal. Neck supple. No JVD present.   Normal range of motion.   Full passive range of motion without pain.     Cardiovascular:  Normal rate, regular rhythm, S1 normal, S2 normal, normal heart sounds, intact distal pulses and normal pulses.           Pulmonary/Chest: Effort normal and breath sounds normal.   Abdominal: Abdomen is soft and flat. Bowel sounds are normal.   Musculoskeletal:         General: Normal range of motion.      Cervical back: Full passive range of motion without pain, normal range of motion and neck supple.     Neurological: She is alert and oriented to person, place, and time. She has normal strength.   Skin: Skin is warm, dry and intact. Capillary refill takes less than 2 seconds.         ED Course   Procedures  Labs Reviewed   INFLUENZA A & B BY MOLECULAR - Normal       Result Value    INFLUENZA A MOLECULAR Negative      INFLUENZA B MOLECULAR  Negative     GROUP A STREP, MOLECULAR - Normal    Group A Strep Molecular Negative      Narrative:     Arcanobacterium haemolyticum and Beta Streptococcus group C and G will not be detected by this test method.  Please order Throat Culture ([id number]) if suspected.       SARS-COV-2 RNA AMPLIFICATION, QUAL - Normal    SARS COV-2 Molecular Negative            Imaging  Results    None          Medications   erythromycin 5 mg/gram (0.5 %) ophthalmic ointment ( Left Eye Given 6/14/25 1059)     Medical Decision Making  Differential includes conjunctivitis, corneal abrasion, corneal ulcer, foreign body    Problems Addressed:  Conjunctivitis of left eye, unspecified conjunctivitis type: complicated acute illness or injury    ED Management & Risks of Complication, Morbidity, & Mortality:  Magnified eye exam shows no ulceration or foreign body tonight  Erythromycin eye ointment applied in the emergency room  Prescription on discharge with ophthalmology follow-up 48 hours  Pt/Family counseled to return to the ER with any concerns on DC    Need for Hospitalization or Surgery with Social Determinants of Health:  This patient does not need to be hospitalized on ER evaluation today  The patient's diagnosis is not limited by social determinants of health  Does not require surgery or procedure (major or minor), no risk factors    Final diagnoses:  [H10.9] Conjunctivitis of left eye, unspecified conjunctivitis type (Primary)          ED Disposition Condition    Discharge Stable          ED Prescriptions       Medication Sig Dispense Start Date End Date Auth. Provider    erythromycin (ROMYCIN) ophthalmic ointment Place into the left eye every 6 (six) hours. for 7 days 7 g 6/14/2025 6/21/2025 Phi Covington MD          Follow-up Information       Follow up With Specialties Details Why Contact Info    Harleen Saucedo, [facility] In 2 days  [address] [address]  [phone]                     [1]   Social History  Tobacco Use    Smoking status: Every Day     Current packs/day: 1.00     Average packs/day: 1 pack/day for 30.0 years (30.0 ttl pk-yrs)     Types: Cigarettes    Smokeless tobacco: Never   Substance Use Topics    Alcohol use: Yes     Comment: occasional light drinker    Drug use: No        Phi Covington MD  06/14/25 1868

## 2025-06-16 ENCOUNTER — TELEPHONE (OUTPATIENT)
Dept: PAIN MEDICINE | Facility: CLINIC | Age: 65
End: 2025-06-16
Payer: MEDICARE

## 2025-07-30 ENCOUNTER — TELEPHONE (OUTPATIENT)
Dept: PAIN MEDICINE | Facility: CLINIC | Age: 65
End: 2025-07-30
Payer: MEDICARE

## 2025-08-05 ENCOUNTER — HOSPITAL ENCOUNTER (OUTPATIENT)
Dept: RADIOLOGY | Facility: HOSPITAL | Age: 65
Discharge: HOME | End: 2025-08-05
Attending: ANESTHESIOLOGY
Payer: MEDICARE

## 2025-08-05 ENCOUNTER — OFFICE VISIT (OUTPATIENT)
Dept: PAIN MEDICINE | Facility: CLINIC | Age: 65
End: 2025-08-05
Payer: MEDICARE

## 2025-08-05 VITALS
DIASTOLIC BLOOD PRESSURE: 71 MMHG | OXYGEN SATURATION: 97 % | BODY MASS INDEX: 43.54 KG/M2 | HEIGHT: 64 IN | WEIGHT: 255 LBS | SYSTOLIC BLOOD PRESSURE: 122 MMHG | HEART RATE: 67 BPM

## 2025-08-05 DIAGNOSIS — M54.12 CERVICAL RADICULITIS: ICD-10-CM

## 2025-08-05 DIAGNOSIS — M62.9 MUSCULOSKELETAL DISORDER INVOLVING UPPER TRAPEZIUS MUSCLE: ICD-10-CM

## 2025-08-05 DIAGNOSIS — M50.30 DEGENERATION OF INTERVERTEBRAL DISC OF CERVICAL REGION WITH OSTEOPHYTE OF CERVICAL VERTEBRA: ICD-10-CM

## 2025-08-05 DIAGNOSIS — M47.812 ARTHROPATHY OF CERVICAL FACET JOINT: ICD-10-CM

## 2025-08-05 DIAGNOSIS — M25.511 CHRONIC RIGHT SHOULDER PAIN: ICD-10-CM

## 2025-08-05 DIAGNOSIS — M54.12 CERVICAL RADICULITIS: Primary | ICD-10-CM

## 2025-08-05 DIAGNOSIS — G89.29 CHRONIC RIGHT SHOULDER PAIN: ICD-10-CM

## 2025-08-05 DIAGNOSIS — M54.2 CHRONIC NECK PAIN: ICD-10-CM

## 2025-08-05 DIAGNOSIS — G89.29 CHRONIC NECK PAIN: ICD-10-CM

## 2025-08-05 DIAGNOSIS — M25.78 DEGENERATION OF INTERVERTEBRAL DISC OF CERVICAL REGION WITH OSTEOPHYTE OF CERVICAL VERTEBRA: ICD-10-CM

## 2025-08-05 PROCEDURE — 99999 PR PBB SHADOW E&M-EST. PATIENT-LVL V: CPT | Mod: PBBFAC,,, | Performed by: ANESTHESIOLOGY

## 2025-08-05 PROCEDURE — 72050 X-RAY EXAM NECK SPINE 4/5VWS: CPT | Mod: TC

## 2025-08-05 PROCEDURE — 3008F BODY MASS INDEX DOCD: CPT | Mod: CPTII,S$GLB,, | Performed by: ANESTHESIOLOGY

## 2025-08-05 PROCEDURE — 3074F SYST BP LT 130 MM HG: CPT | Mod: CPTII,S$GLB,, | Performed by: ANESTHESIOLOGY

## 2025-08-05 PROCEDURE — 72050 X-RAY EXAM NECK SPINE 4/5VWS: CPT | Mod: 26,,, | Performed by: RADIOLOGY

## 2025-08-05 PROCEDURE — 1160F RVW MEDS BY RX/DR IN RCRD: CPT | Mod: CPTII,S$GLB,, | Performed by: ANESTHESIOLOGY

## 2025-08-05 PROCEDURE — 99204 OFFICE O/P NEW MOD 45 MIN: CPT | Mod: S$GLB,,, | Performed by: ANESTHESIOLOGY

## 2025-08-05 PROCEDURE — 1159F MED LIST DOCD IN RCRD: CPT | Mod: CPTII,S$GLB,, | Performed by: ANESTHESIOLOGY

## 2025-08-05 PROCEDURE — 3078F DIAST BP <80 MM HG: CPT | Mod: CPTII,S$GLB,, | Performed by: ANESTHESIOLOGY

## 2025-08-05 PROCEDURE — 73030 X-RAY EXAM OF SHOULDER: CPT | Mod: TC,RT

## 2025-08-05 PROCEDURE — 73030 X-RAY EXAM OF SHOULDER: CPT | Mod: 26,RT,, | Performed by: RADIOLOGY

## 2025-08-05 RX ORDER — MELOXICAM 7.5 MG/1
7.5 TABLET ORAL DAILY PRN
COMMUNITY

## 2025-08-05 RX ORDER — DICLOFENAC SODIUM 10 MG/G
2 GEL TOPICAL DAILY
COMMUNITY
Start: 2025-05-13

## 2025-08-05 RX ORDER — LIDOCAINE 50 MG/G
1 PATCH TOPICAL DAILY
COMMUNITY

## 2025-08-05 RX ORDER — GABAPENTIN 600 MG/1
600 TABLET ORAL 2 TIMES DAILY
COMMUNITY

## 2025-08-05 RX ORDER — IBUPROFEN 800 MG/1
1 TABLET, FILM COATED ORAL DAILY
COMMUNITY
Start: 2022-02-17

## 2025-08-05 NOTE — PROGRESS NOTES
New Patient Evaluation  [facility]    Christina Townsend  : 1960  Date: 2025     CHIEF COMPLAINT:  Neck Pain, Shoulder Pain, Arm Pain, and Hand Pain    Referring Physician: Carla Pemberton MD  Primary Care Physician: Carla Pemberton MD    HPI:  This is a 64 y.o. female with a chief complaint of Neck Pain, Shoulder Pain, Arm Pain, and Hand Pain  . The patient has Past medical history/Past surgical history of  tobacco use disorder, history of bilateral total knee arthroplasty, obesity, depression, history of lumbar radiculopathy, rheumatoid arthritis, left carpal tunnel release, lymphedema of both bilateral lower extremity    Patient was evaluated and referred by  primary care provider for  neck pain and cervical radiculitis+ right shoulder pain    Diabetic: No    Anticoagulation medications: 81 mg Aspirin     Allergy To Iodine: No    Currently on Antibiotic: No    Pain Disability Index Review:      2025     9:06 AM   Last 3 PDI Scores   Pain Disability Index (PDI) 44       Current/ Ooriginal Description of Pain Symptoms:    History of Recent Fall or Trauma: No   Onset: Chronic, started 2 months  Pain Location: neck, RT upper trapezius , right shoulder  Radiates/associated symptoms: RT UE to hand.   Pain is Getting worse over the last 2 months   The pain is continuous.   The pain is described as aching, numbing, shooting, throbbing, and tingling.   Exacerbating factors: Sitting, Standing, Laying, Bending, Walking, Night Time, Lifting, and Getting out of bed/chair.   Mitigating factors medications temporary.   Symptoms interfere with daily activity, sleeping.   The patient feels like symptoms have been worsening.   Patient denies night fever/night sweats, urinary incontinence, bowel incontinence, significant weight loss, significant motor weakness, and loss of sensations.  Mild RT hand  weakness   RT handed     Pain score:   Current: 10/10  Best: 8/10  Worst:  10/10    Current pain medication:  Gabapentin 600mg, BID   Ibuprofen 800mg, PRN  Mobic 7.5mg, QHS  Diclofenac gel, PRN  Lidocaine patches, PRN    Current Narcotics/Opioid /benzo Medications:  Opioids- None  Benzodiazepines: No    UDS:  NA    PDMP:  Reviewed and consistent with medication use as prescribed.     Previous Chronic Pain Treatment History:  Six weeks of conservative therapy include: Physical Therapy/HEP/Physician Lead Exercise Program:  Over the past 12 months, Patient has done 12 sessions.  PT response: Mildly Helpful.   Dates of the PT sessions: from 05/2025 to 06/2025.  Is patient actively participating in home exercise program (HEP)/ physician led exercise program in the last 6 months: Yes.    Non-interventional Pain Therapy:  []Chiropractor.   []Acupuncture/Dry needle.  [x]TENS unit.  [x]Heat/ICE.  []Back Brace.    Medications previously tried:  NSAIDs: Ibuprofen (Advil/Motrin), Meloxicam (Mobic), and Naproxen (Naprosyn)  Topical Agent: Yes  TCA/SSRI/SNRI: SSRI: Fluoxetine (Prozac)  Anti-convulsants: Gabapentin   Muscle Relaxants: Robaxin (methocarbamol )  and Flexeril (Cyclobenzaprine)  Opioids- Oxycodone with Acetaminophen (Percocet), Hydrocodone with Acetaminophen (Norco), and Tramadol (Ultram).    Interventional Pain Procedures:  N/A    Previous spine/Relevant joint surgery:  BL Total knee replacement  RT Shoulder surgery   Surgical History:   has a past surgical history that includes Total knee arthroplasty (4/4/11); partial hysterctomy (2010); Carpal tunnel release (2009); Hysterectomy; Tubal ligation; Joint replacement; Shoulder surgery; and Colonoscopy (N/A, 10/20/2020).  Medical History:   has a past medical history of Back pain, Bronchitis, Contact dermatitis, Corns and callosities, Gout, Lymphedema of both lower extremities, Morbid obesity, RA (rheumatoid arthritis), and Tinea pedis of right foot.  Family History:  family history includes Breast cancer in her mother; Cancer in her mother;  Colon cancer in her father; Hypertension in her brother, father, mother, and sister; Stroke in her father.  Allergies:  Lortab [hydrocodone-acetaminophen]   Social History/SUBSTANCE ABUSE HISTORY:   reports that she has been smoking cigarettes. She has a 30 pack-year smoking history. She has never used smokeless tobacco. She reports current alcohol use. She reports that she does not use drugs.  LABS:  CBC  Lab Results   Component Value Date    WBC 10.21 02/16/2025    HGB 14.6 02/16/2025    HCT 44.9 02/16/2025     Coagulation Profile   Lab Results   Component Value Date     02/16/2025       Lab Results   Component Value Date    INR 1.0 11/20/2020     CMP:  BMP  Lab Results   Component Value Date     02/16/2025    K 4.0 02/16/2025     02/16/2025    CO2 25 02/16/2025    BUN 12 02/16/2025    CREATININE 0.9 02/16/2025    CALCIUM 9.7 02/16/2025    ANIONGAP 10 02/16/2025    EGFRNORACEVR >60 02/16/2025     Lab Results   Component Value Date    ALT 11 02/16/2025    AST 15 02/16/2025    ALKPHOS 69 02/16/2025    BILITOT 1.1 (H) 02/16/2025     HGBA1C:  Lab Results   Component Value Date    HGBA1C 5.5 11/20/2020       ROS:    Review of Systems   GENERAL:  No weight loss, malaise or fevers.  HEENT:   No recent changes in vision or hearing  NECK:  Negative for lumps, no difficulty with swallowing.  RESPIRATORY:  Negative for cough, wheezing or shortness of breath, patient denies any recent URI.  CARDIOVASCULAR:  Negative for chest pain or palpitations.  GI:  Negative for abdominal discomfort, blood in stools or black stools or change in bowel habits.  MUSCULOSKELETAL:  See HPI.  SKIN:  Negative for lesions, rash, and itching.  PSYCH:  No mood disorder or recent psychosocial stressors.   HEMATOLOGY/LYMPHOLOGY:  See the blood thinner sectioned in HPI.  NEURO:  See HPI  All other reviewed and negative other than HPI.    PHYSICAL EXAM:  VITALS: /71 (BP Location: Right arm, Patient Position: Sitting)   Pulse  "67   Ht 5' 4" (1.626 m)   Wt 115.7 kg (255 lb)   SpO2 97%   BMI 43.77 kg/m²   Body mass index is 43.77 kg/m².  GENERAL: Well appearing, in no acute distress, alert and oriented x3, answers questions appropriately.   PSYCH: Flat affect.  SKIN: Skin color, texture, turgor normal, no rashes or lesions.  HEAD/FACE:  Normocephalic, atraumatic. Cranial nerves grossly intact.  CV: Regular rate  PULM: No evidence of respiratory difficulty, symmetric chest rise.  GI:  Soft and non-Distended.  CERVICAL SPINE AND BILATERAL UPPER EXTREMITY EXAM:   INSPECTION: No gross deformities or abnormalities noted.   RANGE OF MOTION: limited in flexion   STABILITY: No instability noted/appreciated.   MYOFASCIAL EXAM: ++, Rt Upper trapezius   SPURLING: + Rt UE   FACET LOADING: Unremarkable bilaterally.    MUSCLE STRENGTH: 5/5 and symmetrical, bilateral upper extremities.   SENSORY EXAM: Intact to light touch, bilateral upper extremities.     Right SHOULDER: No gross deformity or atrophy noted.   ROM: limited, tenderness over the ant aspect of the RT shoulder  Rotator cuff exam GODFREY: +RT  EMPTY CAN SIGN: +RT        DIAGNOSTIC STUDIES AND MEDICAL RECORDS REVIEW:  I have personally reviewed and interpreted relevant radiology reports and reviewed relevant records from other services in the EMR.     There is questionable minimal anterolisthesis of L4 on L5.  Mild disc space narrowing at the L4-L5 and L5-S1 levels.  Facet degenerative changes greatest at the L4-L5 level.    - x-ray cervical spine 05/2025   Anterior spondylolysis at C5-C6 no abnormality and flexion-extension           Clinical Impression:  This is a pleasant 64 y.o. female patient with PMH/PSH of tobacco use disorder, history of bilateral total knee arthroplasty, obesity, depression, history of lumbar radiculopathy, rheumatoid arthritis, left carpal tunnel release, lymphedema of both bilateral lower extremity, presenting with right-sided neck pain, right upper trapezius " muscle pain, right shoulder pain, right upper extremity radiation below the elbow to the hand, patient has completed multiple sessions of physical therapy with limited relief, patient has a prior history of right shoulder surgery, I would recommend obtaining MRI cervical spine.     We discussed the underlying diagnoses and multiple treatment options including non-opioid medications, interventional procedures, physical therapy, home exercise, core muscle enhancement, and weight loss.  The risks and benefits of each treatment option were discussed and all questions were answered.      Encounter Diagnosis:  Christina Townsend is a 64 y.o. female with the following diagnoses based on history, exam, and imagin. Cervical radiculitis  - MRI Cervical Spine Without Contrast; Future  - X-ray Shoulder 2 or More Views Right; Future  - X-Ray Cervical Spine AP Lat with Flex Ex; Future    2. Chronic right shoulder pain    3. Chronic neck pain  - MRI Cervical Spine Without Contrast; Future    4. Arthropathy of cervical facet joint  - MRI Cervical Spine Without Contrast; Future    5. 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 Musculoskeletal disorder involving upper trapezius muscle     Treatment Plan:    Diagnostics/Referrals:  MRI cervical spine    Medications:   Continue as prescribed  Gabapentin 600mg, BID  Ibuprofen 800mg, PRN  Mobic 7.5mg, QHS  Diclofenac gel, PRN  Lidocaine patches, PRN    Interventional Therapy: Procedure based on MRI images.  Sedation: NA.  Anticoagulation medications: 81 mg Aspirin  -Clearance to stop Blood thinner: Yes    Regarding the above interventions, the patient has been educated regarding the risks (including bleeding, infection, increased pain, nerve damage, or allergic reaction), benefits, and alternatives. The patient states she understands and is eager to proceed.    Physical  Rehabilitation: Physician led exercise program and home exercise    Patient Education: Counseled patient regarding the importance of activity modification, I have stressed the importance of physical activity and a home exercise plan to help with pain and improve health.    Follow-up: after MRI.    May consider: REYNOLD and RT shoulder Injection     Delbert Amor MD  Anesthesiologist  Interventional Pain Medicine  08/05/2025    Disclaimer:  This note was prepared using voice recognition system and is likely to have sound alike errors that may have been overlooked even after proof reading.  Please call me with any questions.

## 2025-08-05 NOTE — PROGRESS NOTES
New Patient Evaluation  [facility]    Christina Townsend  : 1960  Date: 2025     CHIEF COMPLAINT:  No chief complaint on file.    Referring Physician: Carla Pemberton MD  Primary Care Physician: Carla Pemberton MD    HPI:  This is a 64 y.o. female with a chief complaint of No chief complaint on file.  . The patient has Past medical history/Past surgical history of  tobacco use disorder, history of bilateral total knee arthroplasty, obesity, depression, history of lumbar radiculopathy, rheumatoid arthritis, left carpal tunnel release, lymphedema of both bilateral lower extremity    Patient was evaluated and referred by  primary care provider for low back pain    Diabetic: {GAYes/No/NA:25818}    {Anticoagulation medications:85151}    Allergy To Iodine: {GAYes/No/NA:40502}    Currently on Antibiotic: {GAYes/No/NA:33340}    Pain Disability Index Review:       No data to display                Current/ Ooriginal Description of Pain Symptoms:    History of Recent Fall or Trauma: {GAYes/No/NA:01314}   Onset: Chronic, started ***  Pain Location: ***  Radiates/associated symptoms: ***.   Pain is Getting worse over the last *** months   The pain is {Intermittent/Continuous:37771}.   The pain is described as {Desc; pain character:07952}.   Exacerbating factors: {Causes; Pain:23212}.   Mitigating factors ***.   Symptoms interfere with daily activity, sleeping, and ***.   The patient feels like symptoms have been {IUW:52270}.   Patient {Denies / Reports:64667} {RED FLAGS:83325}.    Pain score:   Current: {PAIN 0-10:95924}/10  Best: {PAIN 0-10:40763}/10  Worst: {PAIN 0-10:86545}/10    Current pain medication:   Flexeril  Tramadol      Current Narcotics/Opioid /benzo Medications:  Opioids- {GAopioid:23638}  Benzodiazepines: {GAYes/No/NA:77801}    UDS:  NA    PDMP:  Reviewed and consistent with medication use as prescribed.     Previous Chronic Pain Treatment History:  Six weeks of  conservative therapy include: Physical Therapy/HEP/Physician Lead Exercise Program:  Over the past 12 months, Patient has done *** sessions.  PT response: {PT response:60930} Helpful.   Dates of the PT sessions: from *** to ***.  Is patient actively participating in home exercise program (HEP)/ physician led exercise program in the last 6 months: {GAYes/No/NA:03846}.    Non-interventional Pain Therapy:  []Chiropractor.   []Acupuncture/Dry needle.  []TENS unit.  []Heat/ICE.  []Back Brace.    Medications previously tried:  NSAIDs: {GANSIAD:89674}  Topical Agent: {GAYes/No/NA:16366}  TCA/SSRI/SNRI: {GATCA/SSRI/SNRI:04189}  Anti-convulsants: {GAAnticonvulsants:22140}  Muscle Relaxants: {GAmuscle Relaxant:97306}  Opioids- {GAopioid:50436}.    Interventional Pain Procedures:  ***    Previous spine/Relevant joint surgery:  ***  Surgical History:   has a past surgical history that includes Total knee arthroplasty (4/4/11); partial hysterctomy (2010); Carpal tunnel release (2009); Hysterectomy; Tubal ligation; Joint replacement; Shoulder surgery; and Colonoscopy (N/A, 10/20/2020).  Medical History:   has a past medical history of Back pain, Bronchitis, Contact dermatitis, Corns and callosities, Gout, Lymphedema of both lower extremities, Morbid obesity, RA (rheumatoid arthritis), and Tinea pedis of right foot.  Family History:  family history includes Breast cancer in her mother; Cancer in her mother; Colon cancer in her father; Hypertension in her brother, father, mother, and sister; Stroke in her father.  Allergies:  Lortab [hydrocodone-acetaminophen]   Social History/SUBSTANCE ABUSE HISTORY:   reports that she has been smoking cigarettes. She has a 30 pack-year smoking history. She has never used smokeless tobacco. She reports current alcohol use. She reports that she does not use drugs.  LABS:  CBC  Lab Results   Component Value Date    WBC 10.21 02/16/2025    HGB 14.6 02/16/2025    HCT 44.9 02/16/2025     Coagulation  Profile   Lab Results   Component Value Date     02/16/2025       Lab Results   Component Value Date    INR 1.0 11/20/2020     CMP:  BMP  Lab Results   Component Value Date     02/16/2025    K 4.0 02/16/2025     02/16/2025    CO2 25 02/16/2025    BUN 12 02/16/2025    CREATININE 0.9 02/16/2025    CALCIUM 9.7 02/16/2025    ANIONGAP 10 02/16/2025    EGFRNORACEVR >60 02/16/2025     Lab Results   Component Value Date    ALT 11 02/16/2025    AST 15 02/16/2025    ALKPHOS 69 02/16/2025    BILITOT 1.1 (H) 02/16/2025     HGBA1C:  Lab Results   Component Value Date    HGBA1C 5.5 11/20/2020       ROS:    Review of Systems   GENERAL:  No weight loss, malaise or fevers.  HEENT:   No recent changes in vision or hearing  NECK:  Negative for lumps, no difficulty with swallowing.  RESPIRATORY:  Negative for cough, wheezing or shortness of breath, patient denies any recent URI.  CARDIOVASCULAR:  Negative for chest pain or palpitations.  GI:  Negative for abdominal discomfort, blood in stools or black stools or change in bowel habits.  MUSCULOSKELETAL:  See HPI.  SKIN:  Negative for lesions, rash, and itching.  PSYCH:  No mood disorder or recent psychosocial stressors.   HEMATOLOGY/LYMPHOLOGY:  See the blood thinner sectioned in HPI.  NEURO:  See HPI  All other reviewed and negative other than HPI.    PHYSICAL EXAM:  VITALS: There were no vitals taken for this visit.  There is no height or weight on file to calculate BMI.  GENERAL: Well appearing, in no acute distress, alert and oriented x3, answers questions appropriately.   PSYCH: Flat affect.  SKIN: Skin color, texture, turgor normal, no rashes or lesions.  HEAD/FACE:  Normocephalic, atraumatic. Cranial nerves grossly intact.  CV: Regular rate  PULM: No evidence of respiratory difficulty, symmetric chest rise.  GI:  Soft and non-Distended.    BACK/SIJ/HIP:  Lumbar Spine Exam:       Inspection: No erythema, bruising.       Palpation: ({GA+:21347}) TTP of lumbar  paraspinals bilaterally      ROM:  Limited in flexion, extension, lateral bending.       ({GA+:08511}) Facet loading {GAHip:10610}      ({GA+:44555}) Straight Leg Raise, {GAHip:79228}      ({GA+:09534}) DONA, Tenderness over the PSIS, Yeoman test, {GAHip:42415}  Hip Exam:      Inspection: No gross deformity or apparent leg length discrepancy      Palpation:  No TTP to bilateral greater trochanteric bursas.       ROM:  *** limitation Due to pain in internal rotation, external rotation b/l  Neurologic Exam:     Alert. Speech is fluent and appropriate.     Strength: ***/5 in {GAHip:54906} hip flexion and knee extension     Sensation:  Grossly intact to light touch in bilateral lower extremities    GAIT: {GAgait:70430}    DIAGNOSTIC STUDIES AND MEDICAL RECORDS REVIEW:  I have personally reviewed and interpreted relevant radiology reports and reviewed relevant records from other services in the EMR.     There is questionable minimal anterolisthesis of L4 on L5.  Mild disc space narrowing at the L4-L5 and L5-S1 levels.  Facet degenerative changes greatest at the L4-L5 level.     Clinical Impression:  This is a pleasant 64 y.o. female patient with PMH/PSH of ***, presenting with***.     We discussed the underlying diagnoses and multiple treatment options including non-opioid medications, interventional procedures, physical therapy, home exercise, core muscle enhancement, and weight loss.  The risks and benefits of each treatment option were discussed and all questions were answered.      Encounter Diagnosis:  Christina Townsend is a 64 y.o. female with the following diagnoses based on history, exam, and imaging:  There are no diagnoses linked to this encounter.     Treatment Plan:    Diagnostics/Referrals: MRI lumbar spine    Medications:    NSAIDs: {GANSIAD:85802}  Topical Agent: {GAYes/No/NA:78450}  TCA/SSRI/SNRI: {GATCA/SSRI/SNRI:92645}  Anti-convulsants: {GAAnticonvulsants:61468}  Muscle Relaxants: {GAmuscle  "Relaxant:54120}  Opioids: {GAopioid:86213::"None"}  Patient was educated about the risk and benefit of chronic opioid therapy including dependency, addiction, diversion, and opioid hyperalgesia.  Interventional Therapy: {GAProcedure:84329}.  Sedation: {GAsedation:75420}.  {Anticoagulation medications:44512} -Clearance to stop Blood thinner: {GAYes/No/NA:07500}    Regarding the above interventions, the patient has been educated regarding the risks (including bleeding, infection, increased pain, nerve damage, or allergic reaction), benefits, and alternatives. The patient states she understands and is eager to proceed.    Physical Rehabilitation: {GAPT:20128}    Patient Education: Counseled patient regarding the importance of {:78220}, I have stressed the importance of physical activity and a home exercise plan to help with pain and improve health.    Follow-up: ***.    May consider:     Delbert Amor MD  Anesthesiologist  Interventional Pain Medicine  08/05/2025    Disclaimer:  This note was prepared using voice recognition system and is likely to have sound alike errors that may have been overlooked even after proof reading.  Please call me with any questions.    "

## 2025-08-14 ENCOUNTER — HOSPITAL ENCOUNTER (OUTPATIENT)
Dept: RADIOLOGY | Facility: HOSPITAL | Age: 65
Discharge: HOME | End: 2025-08-14
Attending: ANESTHESIOLOGY
Payer: MEDICARE

## 2025-08-14 DIAGNOSIS — M25.78 DEGENERATION OF INTERVERTEBRAL DISC OF CERVICAL REGION WITH OSTEOPHYTE OF CERVICAL VERTEBRA: ICD-10-CM

## 2025-08-14 DIAGNOSIS — M54.12 CERVICAL RADICULITIS: ICD-10-CM

## 2025-08-14 DIAGNOSIS — M50.30 DEGENERATION OF INTERVERTEBRAL DISC OF CERVICAL REGION WITH OSTEOPHYTE OF CERVICAL VERTEBRA: ICD-10-CM

## 2025-08-14 DIAGNOSIS — M47.812 ARTHROPATHY OF CERVICAL FACET JOINT: ICD-10-CM

## 2025-08-14 DIAGNOSIS — G89.29 CHRONIC NECK PAIN: ICD-10-CM

## 2025-08-14 DIAGNOSIS — M54.2 CHRONIC NECK PAIN: ICD-10-CM

## 2025-08-14 PROCEDURE — 72141 MRI NECK SPINE W/O DYE: CPT | Mod: TC

## 2025-08-15 ENCOUNTER — TELEPHONE (OUTPATIENT)
Dept: PAIN MEDICINE | Facility: CLINIC | Age: 65
End: 2025-08-15
Payer: MEDICARE

## 2025-08-15 DIAGNOSIS — M54.12 CERVICAL RADICULITIS: Primary | ICD-10-CM

## 2025-08-28 ENCOUNTER — TELEPHONE (OUTPATIENT)
Dept: PREADMISSION TESTING | Facility: HOSPITAL | Age: 65
End: 2025-08-28
Payer: MEDICARE

## 2025-08-29 ENCOUNTER — HOSPITAL ENCOUNTER (OUTPATIENT)
Facility: HOSPITAL | Age: 65
Discharge: HOME | End: 2025-08-29
Attending: ANESTHESIOLOGY | Admitting: ANESTHESIOLOGY
Payer: MEDICARE

## 2025-08-29 VITALS
TEMPERATURE: 98 F | SYSTOLIC BLOOD PRESSURE: 163 MMHG | DIASTOLIC BLOOD PRESSURE: 74 MMHG | RESPIRATION RATE: 18 BRPM | OXYGEN SATURATION: 100 % | HEART RATE: 58 BPM

## 2025-08-29 DIAGNOSIS — M54.12 CERVICAL RADICULOPATHY: Primary | ICD-10-CM

## 2025-08-29 DIAGNOSIS — G89.29 CHRONIC PAIN: ICD-10-CM

## 2025-08-29 DIAGNOSIS — M54.12 CERVICAL RADICULITIS: ICD-10-CM

## 2025-08-29 DIAGNOSIS — F41.9 ANXIOUSNESS: ICD-10-CM

## 2025-08-29 PROCEDURE — 63600175 PHARM REV CODE 636 W HCPCS: Performed by: ANESTHESIOLOGY

## 2025-08-29 PROCEDURE — 62321 NJX INTERLAMINAR CRV/THRC: CPT | Performed by: ANESTHESIOLOGY

## 2025-08-29 PROCEDURE — 25000003 PHARM REV CODE 250: Performed by: ANESTHESIOLOGY

## 2025-08-29 PROCEDURE — 62321 NJX INTERLAMINAR CRV/THRC: CPT | Mod: ,,, | Performed by: ANESTHESIOLOGY

## 2025-08-29 PROCEDURE — 25500020 PHARM REV CODE 255: Performed by: ANESTHESIOLOGY

## 2025-08-29 RX ORDER — DEXAMETHASONE SODIUM PHOSPHATE 10 MG/ML
INJECTION, SOLUTION INTRA-ARTICULAR; INTRALESIONAL; INTRAMUSCULAR; INTRAVENOUS; SOFT TISSUE
Status: DISCONTINUED | OUTPATIENT
Start: 2025-08-29 | End: 2025-08-29 | Stop reason: HOSPADM

## 2025-08-29 RX ORDER — LIDOCAINE HYDROCHLORIDE 10 MG/ML
INJECTION, SOLUTION EPIDURAL; INFILTRATION; INTRACAUDAL; PERINEURAL
Status: DISCONTINUED | OUTPATIENT
Start: 2025-08-29 | End: 2025-08-29 | Stop reason: HOSPADM

## 2025-08-29 RX ORDER — ALPRAZOLAM 0.25 MG/1
0.5 TABLET, ORALLY DISINTEGRATING ORAL
Status: DISCONTINUED | OUTPATIENT
Start: 2025-08-29 | End: 2025-08-29 | Stop reason: HOSPADM

## 2025-08-29 RX ORDER — LIDOCAINE HYDROCHLORIDE 20 MG/ML
INJECTION, SOLUTION INFILTRATION; PERINEURAL
Status: DISCONTINUED | OUTPATIENT
Start: 2025-08-29 | End: 2025-08-29 | Stop reason: HOSPADM

## 2025-08-29 RX ADMIN — ALPRAZOLAM 0.5 MG: 0.25 TABLET, ORALLY DISINTEGRATING ORAL at 12:08

## (undated) DEVICE — MARKER SKIN RULER AND LABEL

## (undated) DEVICE — KIT NERVE BLOCK PREP BAPTIST

## (undated) DEVICE — CHLORAPREP 10.5 ML APPLICATOR